# Patient Record
Sex: FEMALE | Race: OTHER | NOT HISPANIC OR LATINO | URBAN - METROPOLITAN AREA
[De-identification: names, ages, dates, MRNs, and addresses within clinical notes are randomized per-mention and may not be internally consistent; named-entity substitution may affect disease eponyms.]

---

## 2018-11-12 ENCOUNTER — OUTPATIENT (OUTPATIENT)
Dept: OUTPATIENT SERVICES | Facility: HOSPITAL | Age: 38
LOS: 1 days | End: 2018-11-12
Payer: SELF-PAY

## 2018-11-12 ENCOUNTER — APPOINTMENT (OUTPATIENT)
Dept: ULTRASOUND IMAGING | Facility: CLINIC | Age: 38
End: 2018-11-12
Payer: SUBSIDIZED

## 2018-11-12 DIAGNOSIS — Z00.8 ENCOUNTER FOR OTHER GENERAL EXAMINATION: ICD-10-CM

## 2018-11-12 DIAGNOSIS — Z98.890 OTHER SPECIFIED POSTPROCEDURAL STATES: Chronic | ICD-10-CM

## 2018-11-12 PROCEDURE — 76641 ULTRASOUND BREAST COMPLETE: CPT

## 2018-11-12 PROCEDURE — 76641 ULTRASOUND BREAST COMPLETE: CPT | Mod: 26,LT

## 2018-12-06 ENCOUNTER — OUTPATIENT (OUTPATIENT)
Dept: OUTPATIENT SERVICES | Facility: HOSPITAL | Age: 38
LOS: 1 days | End: 2018-12-06
Payer: COMMERCIAL

## 2018-12-06 ENCOUNTER — APPOINTMENT (OUTPATIENT)
Dept: ULTRASOUND IMAGING | Facility: CLINIC | Age: 38
End: 2018-12-06
Payer: SELF-PAY

## 2018-12-06 ENCOUNTER — RESULT REVIEW (OUTPATIENT)
Age: 38
End: 2018-12-06

## 2018-12-06 DIAGNOSIS — Z98.890 OTHER SPECIFIED POSTPROCEDURAL STATES: Chronic | ICD-10-CM

## 2018-12-06 DIAGNOSIS — Z00.8 ENCOUNTER FOR OTHER GENERAL EXAMINATION: ICD-10-CM

## 2018-12-06 PROCEDURE — 76830 TRANSVAGINAL US NON-OB: CPT | Mod: 26

## 2018-12-06 PROCEDURE — 76856 US EXAM PELVIC COMPLETE: CPT

## 2018-12-06 PROCEDURE — 76856 US EXAM PELVIC COMPLETE: CPT | Mod: 26

## 2018-12-06 PROCEDURE — 76830 TRANSVAGINAL US NON-OB: CPT

## 2019-01-10 ENCOUNTER — APPOINTMENT (OUTPATIENT)
Dept: CARDIOLOGY | Facility: HOSPITAL | Age: 39
End: 2019-01-10

## 2019-03-03 ENCOUNTER — EMERGENCY (EMERGENCY)
Facility: HOSPITAL | Age: 39
LOS: 0 days | Discharge: HOME | End: 2019-03-03
Attending: EMERGENCY MEDICINE | Admitting: EMERGENCY MEDICINE
Payer: MEDICAID

## 2019-03-03 VITALS
DIASTOLIC BLOOD PRESSURE: 70 MMHG | SYSTOLIC BLOOD PRESSURE: 110 MMHG | HEART RATE: 85 BPM | OXYGEN SATURATION: 100 % | RESPIRATION RATE: 18 BRPM

## 2019-03-03 VITALS — WEIGHT: 160.06 LBS | HEIGHT: 66 IN

## 2019-03-03 DIAGNOSIS — R07.9 CHEST PAIN, UNSPECIFIED: ICD-10-CM

## 2019-03-03 DIAGNOSIS — R05 COUGH: ICD-10-CM

## 2019-03-03 DIAGNOSIS — Z95.818 PRESENCE OF OTHER CARDIAC IMPLANTS AND GRAFTS: ICD-10-CM

## 2019-03-03 DIAGNOSIS — Z98.890 OTHER SPECIFIED POSTPROCEDURAL STATES: Chronic | ICD-10-CM

## 2019-03-03 LAB
ALBUMIN SERPL ELPH-MCNC: 3.8 G/DL — SIGNIFICANT CHANGE UP (ref 3.3–5)
ALP SERPL-CCNC: 70 U/L — SIGNIFICANT CHANGE UP (ref 40–120)
ALT FLD-CCNC: 12 U/L — SIGNIFICANT CHANGE UP (ref 12–78)
ANION GAP SERPL CALC-SCNC: 6 MMOL/L — SIGNIFICANT CHANGE UP (ref 5–17)
APPEARANCE UR: CLEAR — SIGNIFICANT CHANGE UP
APTT BLD: 28 SEC — SIGNIFICANT CHANGE UP (ref 27.5–36.3)
AST SERPL-CCNC: 14 U/L — LOW (ref 15–37)
BACTERIA # UR AUTO: ABNORMAL
BASOPHILS # BLD AUTO: 0.05 K/UL — SIGNIFICANT CHANGE UP (ref 0–0.2)
BASOPHILS NFR BLD AUTO: 0.5 % — SIGNIFICANT CHANGE UP (ref 0–2)
BILIRUB SERPL-MCNC: 0.2 MG/DL — SIGNIFICANT CHANGE UP (ref 0.2–1.2)
BILIRUB UR-MCNC: NEGATIVE — SIGNIFICANT CHANGE UP
BUN SERPL-MCNC: 13 MG/DL — SIGNIFICANT CHANGE UP (ref 7–23)
CALCIUM SERPL-MCNC: 8.5 MG/DL — SIGNIFICANT CHANGE UP (ref 8.5–10.1)
CHLORIDE SERPL-SCNC: 108 MMOL/L — SIGNIFICANT CHANGE UP (ref 96–108)
CO2 SERPL-SCNC: 28 MMOL/L — SIGNIFICANT CHANGE UP (ref 22–31)
COLOR SPEC: YELLOW — SIGNIFICANT CHANGE UP
CREAT SERPL-MCNC: 0.74 MG/DL — SIGNIFICANT CHANGE UP (ref 0.5–1.3)
DIFF PNL FLD: ABNORMAL
EOSINOPHIL # BLD AUTO: 0.39 K/UL — SIGNIFICANT CHANGE UP (ref 0–0.5)
EOSINOPHIL NFR BLD AUTO: 4.2 % — SIGNIFICANT CHANGE UP (ref 0–6)
EPI CELLS # UR: SIGNIFICANT CHANGE UP
GLUCOSE SERPL-MCNC: 108 MG/DL — HIGH (ref 70–99)
GLUCOSE UR QL: NEGATIVE MG/DL — SIGNIFICANT CHANGE UP
HCG SERPL-ACNC: <1 MIU/ML — SIGNIFICANT CHANGE UP
HCT VFR BLD CALC: 39.6 % — SIGNIFICANT CHANGE UP (ref 34.5–45)
HGB BLD-MCNC: 13.1 G/DL — SIGNIFICANT CHANGE UP (ref 11.5–15.5)
IMM GRANULOCYTES NFR BLD AUTO: 0.2 % — SIGNIFICANT CHANGE UP (ref 0–1.5)
INR BLD: 1.1 RATIO — SIGNIFICANT CHANGE UP (ref 0.88–1.16)
KETONES UR-MCNC: NEGATIVE — SIGNIFICANT CHANGE UP
LEUKOCYTE ESTERASE UR-ACNC: NEGATIVE — SIGNIFICANT CHANGE UP
LYMPHOCYTES # BLD AUTO: 2.91 K/UL — SIGNIFICANT CHANGE UP (ref 1–3.3)
LYMPHOCYTES # BLD AUTO: 31.4 % — SIGNIFICANT CHANGE UP (ref 13–44)
MCHC RBC-ENTMCNC: 29.4 PG — SIGNIFICANT CHANGE UP (ref 27–34)
MCHC RBC-ENTMCNC: 33.1 GM/DL — SIGNIFICANT CHANGE UP (ref 32–36)
MCV RBC AUTO: 89 FL — SIGNIFICANT CHANGE UP (ref 80–100)
MONOCYTES # BLD AUTO: 0.64 K/UL — SIGNIFICANT CHANGE UP (ref 0–0.9)
MONOCYTES NFR BLD AUTO: 6.9 % — SIGNIFICANT CHANGE UP (ref 2–14)
NEUTROPHILS # BLD AUTO: 5.26 K/UL — SIGNIFICANT CHANGE UP (ref 1.8–7.4)
NEUTROPHILS NFR BLD AUTO: 56.8 % — SIGNIFICANT CHANGE UP (ref 43–77)
NITRITE UR-MCNC: NEGATIVE — SIGNIFICANT CHANGE UP
NRBC # BLD: 0 /100 WBCS — SIGNIFICANT CHANGE UP (ref 0–0)
PH UR: 5 — SIGNIFICANT CHANGE UP (ref 5–8)
PLATELET # BLD AUTO: 327 K/UL — SIGNIFICANT CHANGE UP (ref 150–400)
POTASSIUM SERPL-MCNC: 3.8 MMOL/L — SIGNIFICANT CHANGE UP (ref 3.5–5.3)
POTASSIUM SERPL-SCNC: 3.8 MMOL/L — SIGNIFICANT CHANGE UP (ref 3.5–5.3)
PROT SERPL-MCNC: 7.8 GM/DL — SIGNIFICANT CHANGE UP (ref 6–8.3)
PROT UR-MCNC: NEGATIVE MG/DL — SIGNIFICANT CHANGE UP
PROTHROM AB SERPL-ACNC: 12.2 SEC — SIGNIFICANT CHANGE UP (ref 10–12.9)
RBC # BLD: 4.45 M/UL — SIGNIFICANT CHANGE UP (ref 3.8–5.2)
RBC # FLD: 12.7 % — SIGNIFICANT CHANGE UP (ref 10.3–14.5)
RBC CASTS # UR COMP ASSIST: ABNORMAL /HPF (ref 0–4)
SODIUM SERPL-SCNC: 142 MMOL/L — SIGNIFICANT CHANGE UP (ref 135–145)
SP GR SPEC: 1.02 — SIGNIFICANT CHANGE UP (ref 1.01–1.02)
TROPONIN I SERPL-MCNC: <0.015 NG/ML — SIGNIFICANT CHANGE UP (ref 0.01–0.04)
TROPONIN I SERPL-MCNC: <0.015 NG/ML — SIGNIFICANT CHANGE UP (ref 0.01–0.04)
UROBILINOGEN FLD QL: 1 MG/DL
WBC # BLD: 9.27 K/UL — SIGNIFICANT CHANGE UP (ref 3.8–10.5)
WBC # FLD AUTO: 9.27 K/UL — SIGNIFICANT CHANGE UP (ref 3.8–10.5)
WBC UR QL: SIGNIFICANT CHANGE UP

## 2019-03-03 PROCEDURE — 71046 X-RAY EXAM CHEST 2 VIEWS: CPT | Mod: 26

## 2019-03-03 PROCEDURE — 99284 EMERGENCY DEPT VISIT MOD MDM: CPT

## 2019-03-03 RX ORDER — CEPHALEXIN 500 MG
1 CAPSULE ORAL
Qty: 14 | Refills: 0
Start: 2019-03-03 | End: 2019-03-09

## 2019-03-03 RX ORDER — ACETAMINOPHEN 500 MG
975 TABLET ORAL ONCE
Refills: 0 | Status: COMPLETED | OUTPATIENT
Start: 2019-03-03 | End: 2019-03-03

## 2019-03-03 RX ORDER — CEPHALEXIN 500 MG
500 CAPSULE ORAL EVERY 12 HOURS
Refills: 0 | Status: DISCONTINUED | OUTPATIENT
Start: 2019-03-03 | End: 2019-03-03

## 2019-03-03 RX ORDER — DEXAMETHASONE 0.5 MG/5ML
5 ELIXIR ORAL ONCE
Refills: 0 | Status: COMPLETED | OUTPATIENT
Start: 2019-03-03 | End: 2019-03-03

## 2019-03-03 RX ORDER — SODIUM CHLORIDE 9 MG/ML
1000 INJECTION INTRAMUSCULAR; INTRAVENOUS; SUBCUTANEOUS ONCE
Refills: 0 | Status: COMPLETED | OUTPATIENT
Start: 2019-03-03 | End: 2019-03-03

## 2019-03-03 RX ADMIN — Medication 100 MILLIGRAM(S): at 18:21

## 2019-03-03 RX ADMIN — Medication 500 MILLIGRAM(S): at 22:41

## 2019-03-03 RX ADMIN — SODIUM CHLORIDE 1000 MILLILITER(S): 9 INJECTION INTRAMUSCULAR; INTRAVENOUS; SUBCUTANEOUS at 18:22

## 2019-03-03 RX ADMIN — Medication 5 MILLIGRAM(S): at 18:21

## 2019-03-03 RX ADMIN — Medication 975 MILLIGRAM(S): at 18:21

## 2019-03-03 NOTE — ED STATDOCS - OBJECTIVE STATEMENT
37 y/o F with PMHx of Prediabetes and questionable heart condition (reports that she has a cardiac monitoring device implanted to monitor her after she was experiencing multiple syncopal episodes), presenting to the ED c/o back pain, CP, cough, sore throat, HA, and watery eyes starting yesterday. Pt speaks [place]men and HPI obtained via [facility] , #[id number]. No fevers, chills, rashes, or SOB. NKDA.

## 2019-03-03 NOTE — ED STATDOCS - NS_ ATTENDINGSCRIBEDETAILS _ED_A_ED_FT
The scribe's documentation has been prepared under my direction and personally reviewed by me in its entirety.  I confirm that the note above accurately reflects all my work, treatment, procedures, and decision making except where otherwise noted or amended by me.  Phil Pappas M.D.

## 2019-03-03 NOTE — ED STATDOCS - CLINICAL SUMMARY MEDICAL DECISION MAKING FREE TEXT BOX
39 y/o F with PMHx of Prediabetes and questionable heart condition (reports that she has a cardiac monitoring device implanted to monitor her after she was experiencing multiple syncopal episodes), presenting to the ED c/o back pain, CP, cough, sore throat, HA, and watery eyes starting yesterday. VSS. Exam non-focal. Plan: labs, CXR, symptomatic treatment, reassess. 39 y/o F with PMHx of Prediabetes and questionable heart condition (reports that she has a cardiac monitoring device implanted to monitor her after she was experiencing multiple syncopal episodes), presenting to the ED c/o back pain, CP, cough, sore throat, HA, and watery eyes starting yesterday. VSS. Exam non-focal. Plan: EKG, serial trop, labs, CXR, symptomatic treatment, reassess.

## 2019-03-03 NOTE — ED STATDOCS - PROGRESS NOTE DETAILS
History with assistance from  ID#[id number]. Pt is a 39 y/o F with PMH of Pre-DM, ?heart condition, followed by Dr. Philippe outpatient cardiology, presents with productive cough, CP, SOB x 2 days. Pt reports subjective fever, chills since that time as well. Reports her landlord sick with similar symptoms over that time. Reports CP as present when she coughs. Productive cough with green sputum. Denies recent trauma, prolonged travel. PE: Well appearing. Cardiac: s1/s2, RRR. Lungs: CTAB. Abdomen: NBS x4, soft, nontender. HEENT: No oropharyngeal erythema, tonsilar enlargement/exudates, uvular deviation. A/P: likely viral URI. Will check labs, EKG, CXR, reassess. - Raf Enriquez PA-C Results of Lab work and chest X-Ray discussed with patient. Reports given to patient. Jone HOWARD

## 2019-03-03 NOTE — ED STATDOCS - ATTENDING CONTRIBUTION TO CARE
SOTO Pappas MD,  performed the initial face to face bedside interview with this patient regarding history of present illness, review of symptoms and relevant past medical, social and family history.  I completed an independent physical examination.  I was the initial provider who evaluated this patient. I have signed out the follow up of any pending tests (i.e. labs, radiological studies) to the ACP.  I have communicated the patient’s plan of care and disposition with the ACP.

## 2019-03-04 LAB
CULTURE RESULTS: SIGNIFICANT CHANGE UP
SPECIMEN SOURCE: SIGNIFICANT CHANGE UP

## 2019-04-01 ENCOUNTER — APPOINTMENT (OUTPATIENT)
Dept: ELECTROPHYSIOLOGY | Facility: CLINIC | Age: 39
End: 2019-04-01
Payer: MEDICAID

## 2019-04-01 VITALS
SYSTOLIC BLOOD PRESSURE: 192 MMHG | DIASTOLIC BLOOD PRESSURE: 64 MMHG | HEART RATE: 83 BPM | HEIGHT: 62 IN | WEIGHT: 128 LBS | BODY MASS INDEX: 23.55 KG/M2

## 2019-04-01 DIAGNOSIS — Z45.09 ENCOUNTER FOR ADJUSTMENT AND MANAGEMENT OF OTHER CARDIAC DEVICE: ICD-10-CM

## 2019-04-01 PROBLEM — Z00.00 ENCOUNTER FOR PREVENTIVE HEALTH EXAMINATION: Noted: 2019-04-01

## 2019-04-01 PROCEDURE — 93285 PRGRMG DEV EVAL SCRMS IP: CPT

## 2019-04-01 PROCEDURE — XXXXX: CPT

## 2019-04-01 NOTE — HISTORY OF PRESENT ILLNESS
[None] : The patient complains of no symptoms [de-identified] : 67 yo female referred from  [facility] associate group (Dr Philippe [phone]) for evaluation of LINQ loop recorder.  Pt states she moved here fro [place], and had this loop recorder place for fainting episodes.  she denies any since implant, c/o occasional pain and tingling at the device site. LINQ interrogathio shows sinus rhythm, multiple event of tachy recorded and appear to be sinus tachy up to heart rate of 188bpm, most recent episode in december 2018, loop recorder at [facility] since 12/25/18.  prior to this linq was last interrogated in august 2016, and patient has not follow up with cardiologist or EP since then.  she reports occasional symptoms of palpitations but they are mild, and she requesting to take the LINQ out.

## 2019-04-01 NOTE — DISCUSSION/SUMMARY
[Patient] : the patient [Family] : the patient's family [FreeTextEntry1] : LINQ at [facility], will schedule LINQ explant.

## 2019-04-01 NOTE — REASON FOR VISIT
[New Patient Device Check] : new patient device check visit [LUCHO (Elective Replacement Indication)] : elective replacement indication [Family Member] : family member

## 2019-04-01 NOTE — PHYSICAL EXAM
[General Appearance - Well Developed] : well developed [Normal Appearance] : normal appearance [Well Groomed] : well groomed [General Appearance - Well Nourished] : well nourished [No Deformities] : no deformities [General Appearance - In No Acute Distress] : no acute distress [Heart Rate And Rhythm] : heart rate and rhythm were normal [Heart Sounds] : normal S1 and S2 [Murmurs] : no murmurs present [Respiration, Rhythm And Depth] : normal respiratory rhythm and effort [Exaggerated Use Of Accessory Muscles For Inspiration] : no accessory muscle use [Auscultation Breath Sounds / Voice Sounds] : lungs were clear to auscultation bilaterally [Clean] : clean [Dry] : dry [Well-Healed] : well-healed [Nail Clubbing] : no clubbing of the fingernails [Cyanosis, Localized] : no localized cyanosis [Petechial Hemorrhages (___cm)] : no petechial hemorrhages [] : no ischemic changes

## 2019-04-17 NOTE — ASU PATIENT PROFILE, ADULT - PSH
Encounter for loop recorder at end of battery life    H/O tubal ligation    History of loop recorder

## 2019-04-18 ENCOUNTER — OUTPATIENT (OUTPATIENT)
Dept: OUTPATIENT SERVICES | Facility: HOSPITAL | Age: 39
LOS: 1 days | Discharge: HOME | End: 2019-04-18
Payer: COMMERCIAL

## 2019-04-18 VITALS
RESPIRATION RATE: 16 BRPM | OXYGEN SATURATION: 100 % | HEART RATE: 72 BPM | SYSTOLIC BLOOD PRESSURE: 103 MMHG | DIASTOLIC BLOOD PRESSURE: 62 MMHG

## 2019-04-18 VITALS
OXYGEN SATURATION: 100 % | HEART RATE: 73 BPM | RESPIRATION RATE: 16 BRPM | WEIGHT: 128.09 LBS | DIASTOLIC BLOOD PRESSURE: 60 MMHG | HEIGHT: 62 IN | SYSTOLIC BLOOD PRESSURE: 105 MMHG

## 2019-04-18 DIAGNOSIS — Z45.09 ENCOUNTER FOR ADJUSTMENT AND MANAGEMENT OF OTHER CARDIAC DEVICE: Chronic | ICD-10-CM

## 2019-04-18 DIAGNOSIS — Z98.890 OTHER SPECIFIED POSTPROCEDURAL STATES: Chronic | ICD-10-CM

## 2019-04-18 DIAGNOSIS — Z98.51 TUBAL LIGATION STATUS: Chronic | ICD-10-CM

## 2019-04-18 PROCEDURE — 33286 RMVL SUBQ CAR RHYTHM MNTR: CPT

## 2019-04-18 NOTE — PROCEDURAL SAFETY CHECKLIST WITH OR WITHOUT SEDATION - NSPOSTCOMMENTFT_GEN_ALL_CORE
Detail Level: Zone
Procedure halted in the holding area and pt transferred to EP Lab 1 for x Ray visualization of ILR to aide physician with removal. Dressing over wound, Procedure explained to pt and pt transferred to ep lab 1.

## 2019-04-18 NOTE — PACU DISCHARGE NOTE - COMMENTS
pt tolerated procedure well. Family at bedside.  IV removed intact. bandaid applied, no bleeding noted. Chest dressing clean dry and intact. vss. Discharge instructions given with follow up appointment, pt verbalized understanding through .  Pt discharged with her mother, Brigid, who will drive pt. home.

## 2019-04-24 DIAGNOSIS — R73.03 PREDIABETES: ICD-10-CM

## 2019-04-24 DIAGNOSIS — R00.2 PALPITATIONS: ICD-10-CM

## 2019-04-24 DIAGNOSIS — Z45.09 ENCOUNTER FOR ADJUSTMENT AND MANAGEMENT OF OTHER CARDIAC DEVICE: ICD-10-CM

## 2019-05-03 ENCOUNTER — APPOINTMENT (OUTPATIENT)
Dept: ELECTROPHYSIOLOGY | Facility: CLINIC | Age: 39
End: 2019-05-03
Payer: MEDICAID

## 2019-05-03 VITALS
HEART RATE: 77 BPM | HEIGHT: 62 IN | DIASTOLIC BLOOD PRESSURE: 52 MMHG | WEIGHT: 129 LBS | SYSTOLIC BLOOD PRESSURE: 99 MMHG | BODY MASS INDEX: 23.74 KG/M2

## 2019-05-03 DIAGNOSIS — Z98.890 OTHER SPECIFIED POSTPROCEDURAL STATES: ICD-10-CM

## 2019-05-03 PROCEDURE — 99211 OFF/OP EST MAY X REQ PHY/QHP: CPT

## 2019-07-05 ENCOUNTER — EMERGENCY (EMERGENCY)
Facility: HOSPITAL | Age: 39
LOS: 0 days | Discharge: HOME | End: 2019-07-06
Attending: FAMILY MEDICINE | Admitting: FAMILY MEDICINE
Payer: MEDICAID

## 2019-07-05 VITALS
TEMPERATURE: 99 F | DIASTOLIC BLOOD PRESSURE: 59 MMHG | SYSTOLIC BLOOD PRESSURE: 105 MMHG | OXYGEN SATURATION: 100 % | HEIGHT: 61 IN | HEART RATE: 75 BPM | WEIGHT: 126.99 LBS | RESPIRATION RATE: 17 BRPM

## 2019-07-05 VITALS
OXYGEN SATURATION: 100 % | RESPIRATION RATE: 17 BRPM | SYSTOLIC BLOOD PRESSURE: 110 MMHG | DIASTOLIC BLOOD PRESSURE: 76 MMHG | HEART RATE: 72 BPM

## 2019-07-05 DIAGNOSIS — Z98.51 TUBAL LIGATION STATUS: ICD-10-CM

## 2019-07-05 DIAGNOSIS — R73.03 PREDIABETES: ICD-10-CM

## 2019-07-05 DIAGNOSIS — Z98.51 TUBAL LIGATION STATUS: Chronic | ICD-10-CM

## 2019-07-05 DIAGNOSIS — R07.9 CHEST PAIN, UNSPECIFIED: ICD-10-CM

## 2019-07-05 DIAGNOSIS — Z98.890 OTHER SPECIFIED POSTPROCEDURAL STATES: Chronic | ICD-10-CM

## 2019-07-05 DIAGNOSIS — Z79.84 LONG TERM (CURRENT) USE OF ORAL HYPOGLYCEMIC DRUGS: ICD-10-CM

## 2019-07-05 DIAGNOSIS — Z45.09 ENCOUNTER FOR ADJUSTMENT AND MANAGEMENT OF OTHER CARDIAC DEVICE: Chronic | ICD-10-CM

## 2019-07-05 LAB
ALBUMIN SERPL ELPH-MCNC: 3.8 G/DL — SIGNIFICANT CHANGE UP (ref 3.3–5)
ALP SERPL-CCNC: 88 U/L — SIGNIFICANT CHANGE UP (ref 40–120)
ALT FLD-CCNC: 18 U/L — SIGNIFICANT CHANGE UP (ref 12–78)
ANION GAP SERPL CALC-SCNC: 6 MMOL/L — SIGNIFICANT CHANGE UP (ref 5–17)
APPEARANCE UR: CLEAR — SIGNIFICANT CHANGE UP
APTT BLD: 26.8 SEC — LOW (ref 27.5–36.3)
AST SERPL-CCNC: 15 U/L — SIGNIFICANT CHANGE UP (ref 15–37)
BACTERIA # UR AUTO: ABNORMAL
BILIRUB SERPL-MCNC: 0.3 MG/DL — SIGNIFICANT CHANGE UP (ref 0.2–1.2)
BILIRUB UR-MCNC: NEGATIVE — SIGNIFICANT CHANGE UP
BUN SERPL-MCNC: 10 MG/DL — SIGNIFICANT CHANGE UP (ref 7–23)
CALCIUM SERPL-MCNC: 8.2 MG/DL — LOW (ref 8.5–10.1)
CHLORIDE SERPL-SCNC: 108 MMOL/L — SIGNIFICANT CHANGE UP (ref 96–108)
CO2 SERPL-SCNC: 27 MMOL/L — SIGNIFICANT CHANGE UP (ref 22–31)
COLOR SPEC: YELLOW — SIGNIFICANT CHANGE UP
CREAT SERPL-MCNC: 0.92 MG/DL — SIGNIFICANT CHANGE UP (ref 0.5–1.3)
D DIMER BLD IA.RAPID-MCNC: <150 NG/ML DDU — SIGNIFICANT CHANGE UP
DIFF PNL FLD: ABNORMAL
EPI CELLS # UR: SIGNIFICANT CHANGE UP
GLUCOSE SERPL-MCNC: 105 MG/DL — HIGH (ref 70–99)
GLUCOSE UR QL: NEGATIVE MG/DL — SIGNIFICANT CHANGE UP
HCT VFR BLD CALC: 36 % — SIGNIFICANT CHANGE UP (ref 34.5–45)
HGB BLD-MCNC: 11.6 G/DL — SIGNIFICANT CHANGE UP (ref 11.5–15.5)
INR BLD: 0.99 RATIO — SIGNIFICANT CHANGE UP (ref 0.88–1.16)
KETONES UR-MCNC: NEGATIVE — SIGNIFICANT CHANGE UP
LEUKOCYTE ESTERASE UR-ACNC: NEGATIVE — SIGNIFICANT CHANGE UP
MAGNESIUM SERPL-MCNC: 2.1 MG/DL — SIGNIFICANT CHANGE UP (ref 1.6–2.6)
MCHC RBC-ENTMCNC: 28.5 PG — SIGNIFICANT CHANGE UP (ref 27–34)
MCHC RBC-ENTMCNC: 32.2 GM/DL — SIGNIFICANT CHANGE UP (ref 32–36)
MCV RBC AUTO: 88.5 FL — SIGNIFICANT CHANGE UP (ref 80–100)
NITRITE UR-MCNC: NEGATIVE — SIGNIFICANT CHANGE UP
PH UR: 5 — SIGNIFICANT CHANGE UP (ref 5–8)
PLATELET # BLD AUTO: 363 K/UL — SIGNIFICANT CHANGE UP (ref 150–400)
POTASSIUM SERPL-MCNC: 3.4 MMOL/L — LOW (ref 3.5–5.3)
POTASSIUM SERPL-SCNC: 3.4 MMOL/L — LOW (ref 3.5–5.3)
PROT SERPL-MCNC: 7.5 GM/DL — SIGNIFICANT CHANGE UP (ref 6–8.3)
PROT UR-MCNC: NEGATIVE MG/DL — SIGNIFICANT CHANGE UP
PROTHROM AB SERPL-ACNC: 11 SEC — SIGNIFICANT CHANGE UP (ref 10–12.9)
RBC # BLD: 4.07 M/UL — SIGNIFICANT CHANGE UP (ref 3.8–5.2)
RBC # FLD: 12.6 % — SIGNIFICANT CHANGE UP (ref 10.3–14.5)
RBC CASTS # UR COMP ASSIST: SIGNIFICANT CHANGE UP /HPF (ref 0–4)
SODIUM SERPL-SCNC: 141 MMOL/L — SIGNIFICANT CHANGE UP (ref 135–145)
SP GR SPEC: 1.01 — SIGNIFICANT CHANGE UP (ref 1.01–1.02)
TROPONIN I SERPL-MCNC: <0.015 NG/ML — SIGNIFICANT CHANGE UP (ref 0.01–0.04)
TROPONIN I SERPL-MCNC: <0.015 NG/ML — SIGNIFICANT CHANGE UP (ref 0.01–0.04)
UROBILINOGEN FLD QL: NEGATIVE MG/DL — SIGNIFICANT CHANGE UP
WBC # BLD: 9.4 K/UL — SIGNIFICANT CHANGE UP (ref 3.8–10.5)
WBC # FLD AUTO: 9.4 K/UL — SIGNIFICANT CHANGE UP (ref 3.8–10.5)
WBC UR QL: SIGNIFICANT CHANGE UP

## 2019-07-05 PROCEDURE — 99284 EMERGENCY DEPT VISIT MOD MDM: CPT

## 2019-07-05 PROCEDURE — 93010 ELECTROCARDIOGRAM REPORT: CPT

## 2019-07-05 PROCEDURE — 71045 X-RAY EXAM CHEST 1 VIEW: CPT | Mod: 26

## 2019-07-05 RX ORDER — KETOROLAC TROMETHAMINE 30 MG/ML
30 SYRINGE (ML) INJECTION ONCE
Refills: 0 | Status: DISCONTINUED | OUTPATIENT
Start: 2019-07-05 | End: 2019-07-05

## 2019-07-05 RX ORDER — SODIUM CHLORIDE 9 MG/ML
1000 INJECTION INTRAMUSCULAR; INTRAVENOUS; SUBCUTANEOUS ONCE
Refills: 0 | Status: COMPLETED | OUTPATIENT
Start: 2019-07-05 | End: 2019-07-05

## 2019-07-05 RX ORDER — ASPIRIN/CALCIUM CARB/MAGNESIUM 324 MG
81 TABLET ORAL ONCE
Refills: 0 | Status: COMPLETED | OUTPATIENT
Start: 2019-07-05 | End: 2019-07-05

## 2019-07-05 RX ORDER — POTASSIUM CHLORIDE 20 MEQ
40 PACKET (EA) ORAL ONCE
Refills: 0 | Status: COMPLETED | OUTPATIENT
Start: 2019-07-05 | End: 2019-07-05

## 2019-07-05 RX ADMIN — Medication 81 MILLIGRAM(S): at 20:02

## 2019-07-05 RX ADMIN — Medication 40 MILLIEQUIVALENT(S): at 23:31

## 2019-07-05 RX ADMIN — SODIUM CHLORIDE 1000 MILLILITER(S): 9 INJECTION INTRAMUSCULAR; INTRAVENOUS; SUBCUTANEOUS at 20:02

## 2019-07-05 RX ADMIN — SODIUM CHLORIDE 1000 MILLILITER(S): 9 INJECTION INTRAMUSCULAR; INTRAVENOUS; SUBCUTANEOUS at 21:20

## 2019-07-05 RX ADMIN — Medication 30 MILLIGRAM(S): at 23:45

## 2019-07-05 NOTE — ED PROVIDER NOTE - PROGRESS NOTE DETAILS
Pt states has had pain in left chest since loop recorder removed months ago. Has had recent neg stress test. No swelling but has scar on left breast. No erythema or fluctuance. Has reproducible tenderness. Will give toradol and advise follow up. Gregor AVILEZ

## 2019-07-05 NOTE — ED ADULT NURSE NOTE - NSIMPLEMENTINTERV_GEN_ALL_ED
Implemented All Universal Safety Interventions:  [place] to call system. Call bell, personal items and telephone within reach. Instruct patient to call for assistance. Room bathroom lighting operational. Non-slip footwear when patient is off stretcher. Physically safe environment: no spills, clutter or unnecessary equipment. Stretcher in lowest position, wheels locked, appropriate side rails in place.

## 2019-07-05 NOTE — ED ADULT NURSE NOTE - OBJECTIVE STATEMENT
Pt comes in complaining of chest pain that started yesterday. pain is 8/10 that radiates from left chest down left arm. pt complains of numbness, sob, nausea, and headache. pt in no respiratory distress at bedside. no aspirin taken today. denies vomiting, diarrhea. pt states she had a chip removed from her heart 2 months ago here at  that monitored her rate but doesn't know what it was called.

## 2019-12-02 ENCOUNTER — EMERGENCY (EMERGENCY)
Facility: HOSPITAL | Age: 39
LOS: 0 days | Discharge: HOME | End: 2019-12-02
Attending: EMERGENCY MEDICINE
Payer: COMMERCIAL

## 2019-12-02 VITALS
SYSTOLIC BLOOD PRESSURE: 120 MMHG | TEMPERATURE: 98 F | RESPIRATION RATE: 16 BRPM | OXYGEN SATURATION: 100 % | DIASTOLIC BLOOD PRESSURE: 81 MMHG | HEART RATE: 75 BPM

## 2019-12-02 VITALS — HEIGHT: 61 IN | WEIGHT: 126.99 LBS

## 2019-12-02 DIAGNOSIS — N39.0 URINARY TRACT INFECTION, SITE NOT SPECIFIED: ICD-10-CM

## 2019-12-02 DIAGNOSIS — R73.03 PREDIABETES: ICD-10-CM

## 2019-12-02 DIAGNOSIS — R35.0 FREQUENCY OF MICTURITION: ICD-10-CM

## 2019-12-02 DIAGNOSIS — Z98.51 TUBAL LIGATION STATUS: Chronic | ICD-10-CM

## 2019-12-02 DIAGNOSIS — Z98.890 OTHER SPECIFIED POSTPROCEDURAL STATES: Chronic | ICD-10-CM

## 2019-12-02 DIAGNOSIS — R30.0 DYSURIA: ICD-10-CM

## 2019-12-02 DIAGNOSIS — Z45.09 ENCOUNTER FOR ADJUSTMENT AND MANAGEMENT OF OTHER CARDIAC DEVICE: Chronic | ICD-10-CM

## 2019-12-02 DIAGNOSIS — R39.15 URGENCY OF URINATION: ICD-10-CM

## 2019-12-02 LAB
APPEARANCE UR: CLEAR — SIGNIFICANT CHANGE UP
BILIRUB UR-MCNC: NEGATIVE — SIGNIFICANT CHANGE UP
COLOR SPEC: YELLOW — SIGNIFICANT CHANGE UP
DIFF PNL FLD: ABNORMAL
GLUCOSE UR QL: NEGATIVE MG/DL — SIGNIFICANT CHANGE UP
KETONES UR-MCNC: ABNORMAL
LEUKOCYTE ESTERASE UR-ACNC: ABNORMAL
NITRITE UR-MCNC: NEGATIVE — SIGNIFICANT CHANGE UP
PH UR: 6.5 — SIGNIFICANT CHANGE UP (ref 5–8)
PROT UR-MCNC: NEGATIVE MG/DL — SIGNIFICANT CHANGE UP
SP GR SPEC: 1.01 — SIGNIFICANT CHANGE UP (ref 1.01–1.02)
UROBILINOGEN FLD QL: NEGATIVE MG/DL — SIGNIFICANT CHANGE UP

## 2019-12-02 PROCEDURE — 81001 URINALYSIS AUTO W/SCOPE: CPT

## 2019-12-02 PROCEDURE — 99283 EMERGENCY DEPT VISIT LOW MDM: CPT

## 2019-12-02 PROCEDURE — 87186 SC STD MICRODIL/AGAR DIL: CPT

## 2019-12-02 PROCEDURE — 87086 URINE CULTURE/COLONY COUNT: CPT

## 2019-12-02 RX ORDER — CEPHALEXIN 500 MG
1 CAPSULE ORAL
Qty: 14 | Refills: 0
Start: 2019-12-02 | End: 2019-12-08

## 2019-12-02 RX ORDER — CEPHALEXIN 500 MG
500 CAPSULE ORAL ONCE
Refills: 0 | Status: COMPLETED | OUTPATIENT
Start: 2019-12-02 | End: 2019-12-02

## 2019-12-02 RX ADMIN — Medication 500 MILLIGRAM(S): at 21:11

## 2019-12-02 NOTE — ED STATDOCS - PATIENT PORTAL LINK FT
You can access the FollowMyHealth Patient Portal offered by [facility] by registering at the following website: [email or web address]. By joining [employer]’s FollowMyHealth portal, you will also be able to view your health information using other applications (apps) compatible with our system.

## 2019-12-02 NOTE — ED ADULT TRIAGE NOTE - CHIEF COMPLAINT QUOTE
pt aaox4, pt presents to er blood in urine and pain upon urination since yesterday, pt c/o feeling warm, but unsure if she has fever.

## 2019-12-02 NOTE — ED STATDOCS - NSFOLLOWUPINSTRUCTIONS_ED_ALL_ED_FT
Urinary Tract Infection, Adult  ImageA urinary tract infection (UTI) is an infection of any part of the urinary tract, which includes the kidneys, ureters, bladder, and urethra. These organs make, store, and get rid of urine in the body. UTI can be a bladder infection (cystitis) or kidney infection (pyelonephritis).    What are the causes?  This infection may be caused by fungi, viruses, or bacteria. Bacteria are the most common cause of UTIs. This condition can also be caused by repeated incomplete emptying of the bladder during urination.    What increases the risk?  This condition is more likely to develop if:    You ignore your need to urinate or hold urine for long periods of time.  You do not empty your bladder completely during urination.  You wipe back to front after urinating or having a bowel movement, if you are female.  You are uncircumcised, if you are male.  You are constipated.  You have a urinary catheter that stays in place (indwelling).  You have a weak defense (immune) system.  You have a medical condition that affects your bowels, kidneys, or bladder.  You have diabetes.  You take antibiotic medicines frequently or for long periods of time, and the antibiotics no longer work well against certain types of infections (antibiotic resistance).  You take medicines that irritate your urinary tract.  You are exposed to chemicals that irritate your urinary tract.  You are female.    What are the signs or symptoms?  Symptoms of this condition include:    Fever.  Frequent urination or passing small amounts of urine frequently.  Needing to urinate urgently.  Pain or burning with urination.  Urine that smells bad or unusual.  Cloudy urine.  Pain in the lower abdomen or back.  Trouble urinating.  Blood in the urine.  Vomiting or being less hungry than normal.  Diarrhea or abdominal pain.  Vaginal discharge, if you are female.    How is this diagnosed?  This condition is diagnosed with a medical history and physical exam. You will also need to provide a urine sample to test your urine. Other tests may be done, including:    Blood tests.  Sexually transmitted disease (STD) testing.    If you have had more than one UTI, a cystoscopy or imaging studies may be done to determine the cause of the infections.    How is this treated?  Treatment for this condition often includes a combination of two or more of the following:    Antibiotic medicine.  Other medicines to treat less common causes of UTI.  Over-the-counter medicines to treat pain.  Drinking enough water to stay hydrated.    Follow these instructions at home:  Take over-the-counter and prescription medicines only as told by your health care provider.  If you were prescribed an antibiotic, take it as told by your health care provider. Do not stop taking the antibiotic even if you start to feel better.  Avoid alcohol, caffeine, tea, and carbonated beverages. They can irritate your bladder.  Drink enough fluid to keep your urine clear or pale yellow.  Keep all follow-up visits as told by your health care provider. This is important.  ImageMake sure to:    Empty your bladder often and completely. Do not hold urine for long periods of time.  Empty your bladder before and after sex.  Wipe from front to back after a bowel movement if you are female. Use each tissue one time when you wipe.    Contact a health care provider if:  You have back pain.  You have a fever.  You feel nauseous or vomit.  Your symptoms do not get better after 3 days.  Your symptoms go away and then return.  Get help right away if:  You have severe back pain or lower abdominal pain.  You are vomiting and cannot keep down any medicines or water.  This information is not intended to replace advice given to you by your health care provider. Make sure you discuss any questions you have with your health care provider. Urinary Tract Infection, Adult  urinary tract infection (UTI) is an infection of any part of the urinary tract, which includes the kidneys, ureters, bladder, and urethra. These organs make, store, and get rid of urine in the body. UTI can be a bladder infection (cystitis) or kidney infection (pyelonephritis).    What are the causes?  This infection may be caused by fungi, viruses, or bacteria. Bacteria are the most common cause of UTIs. This condition can also be caused by repeated incomplete emptying of the bladder during urination.    What increases the risk?  This condition is more likely to develop if:    You ignore your need to urinate or hold urine for long periods of time.  You do not empty your bladder completely during urination.  You wipe back to front after urinating or having a bowel movement, if you are female.  You are uncircumcised, if you are male.  You are constipated.  You have a urinary catheter that stays in place (indwelling).  You have a weak defense (immune) system.  You have a medical condition that affects your bowels, kidneys, or bladder.  You have diabetes.  You take antibiotic medicines frequently or for long periods of time, and the antibiotics no longer work well against certain types of infections (antibiotic resistance).  You take medicines that irritate your urinary tract.  You are exposed to chemicals that irritate your urinary tract.  You are female.    What are the signs or symptoms?  Symptoms of this condition include:    Fever.  Frequent urination or passing small amounts of urine frequently.  Needing to urinate urgently.  Pain or burning with urination.  Urine that smells bad or unusual.  Cloudy urine.  Pain in the lower abdomen or back.  Trouble urinating.  Blood in the urine.  Vomiting or being less hungry than normal.  Diarrhea or abdominal pain.  Vaginal discharge, if you are female.    How is this diagnosed?  This condition is diagnosed with a medical history and physical exam. You will also need to provide a urine sample to test your urine. Other tests may be done, including:    Blood tests.  Sexually transmitted disease (STD) testing.    If you have had more than one UTI, a cystoscopy or imaging studies may be done to determine the cause of the infections.    How is this treated?  Treatment for this condition often includes a combination of two or more of the following:    Antibiotic medicine.  Other medicines to treat less common causes of UTI.  Over-the-counter medicines to treat pain.  Drinking enough water to stay hydrated.    Follow these instructions at home:  Take over-the-counter and prescription medicines only as told by your health care provider.  If you were prescribed an antibiotic, take it as told by your health care provider. Do not stop taking the antibiotic even if you start to feel better.  Avoid alcohol, caffeine, tea, and carbonated beverages. They can irritate your bladder.  Drink enough fluid to keep your urine clear or pale yellow.  Keep all follow-up visits as told by your health care provider. This is important.  ImageMake sure to:    Empty your bladder often and completely. Do not hold urine for long periods of time.  Empty your bladder before and after sex.  Wipe from front to back after a bowel movement if you are female. Use each tissue one time when you wipe.    Contact a health care provider if:  You have back pain.  You have a fever.  You feel nauseous or vomit.  Your symptoms do not get better after 3 days.  Your symptoms go away and then return.  Get help right away if:  You have severe back pain or lower abdominal pain.  You are vomiting and cannot keep down any medicines or water.  This information is not intended to replace advice given to you by your health care provider. Make sure you discuss any questions you have with your health care provider.

## 2019-12-02 NOTE — ED STATDOCS - PHYSICAL EXAMINATION
Constitutional: NAD AAOx3  Eyes: PERRLA EOMI  Mouth: MMM  Cardiac: s1s2. rrr  Lungs: CTA B/L. No accessory muscle use  Abd: soft, nd. Mild suprapubic tenderness. no r/g/r. Neg CVAT B/L  Skin: No rashes

## 2019-12-02 NOTE — ED STATDOCS - OBJECTIVE STATEMENT
[facility]  [id number]. 38 y/o F presents with dysuria since yesterday. Pt complaining of dysuria, frequency/urgency and a small amount of blood in her urine. Pt reports mild suprapubic discomfort. Subjective fever at home. LMP 4 days ago. Denies n/v/d, or other complaints at this time. -Stephen Kendall PA-C

## 2019-12-02 NOTE — ED STATDOCS - PROGRESS NOTE DETAILS
Results reviewed and discussed with pt. Will tx with keflex for uti. Discussed importance of close FU with PMD. Pt asked to return to ED immediately for any new or concerning sx or worsening. Pt acknowledges and understands plan -Stephen Kendall PA-C

## 2019-12-13 ENCOUNTER — EMERGENCY (EMERGENCY)
Facility: HOSPITAL | Age: 39
LOS: 0 days | Discharge: HOME | End: 2019-12-13
Attending: EMERGENCY MEDICINE
Payer: COMMERCIAL

## 2019-12-13 VITALS
HEART RATE: 89 BPM | OXYGEN SATURATION: 100 % | DIASTOLIC BLOOD PRESSURE: 71 MMHG | RESPIRATION RATE: 17 BRPM | SYSTOLIC BLOOD PRESSURE: 104 MMHG

## 2019-12-13 VITALS — HEIGHT: 62 IN | WEIGHT: 128.09 LBS

## 2019-12-13 DIAGNOSIS — Z45.09 ENCOUNTER FOR ADJUSTMENT AND MANAGEMENT OF OTHER CARDIAC DEVICE: Chronic | ICD-10-CM

## 2019-12-13 DIAGNOSIS — Z98.890 OTHER SPECIFIED POSTPROCEDURAL STATES: Chronic | ICD-10-CM

## 2019-12-13 DIAGNOSIS — R06.02 SHORTNESS OF BREATH: ICD-10-CM

## 2019-12-13 DIAGNOSIS — M54.9 DORSALGIA, UNSPECIFIED: ICD-10-CM

## 2019-12-13 DIAGNOSIS — Z98.51 TUBAL LIGATION STATUS: ICD-10-CM

## 2019-12-13 DIAGNOSIS — R73.03 PREDIABETES: ICD-10-CM

## 2019-12-13 DIAGNOSIS — R30.0 DYSURIA: ICD-10-CM

## 2019-12-13 DIAGNOSIS — Z98.51 TUBAL LIGATION STATUS: Chronic | ICD-10-CM

## 2019-12-13 DIAGNOSIS — R05 COUGH: ICD-10-CM

## 2019-12-13 DIAGNOSIS — J06.9 ACUTE UPPER RESPIRATORY INFECTION, UNSPECIFIED: ICD-10-CM

## 2019-12-13 LAB
APPEARANCE UR: CLEAR — SIGNIFICANT CHANGE UP
BILIRUB UR-MCNC: NEGATIVE — SIGNIFICANT CHANGE UP
COLOR SPEC: YELLOW — SIGNIFICANT CHANGE UP
DIFF PNL FLD: ABNORMAL
GLUCOSE UR QL: NEGATIVE MG/DL — SIGNIFICANT CHANGE UP
KETONES UR-MCNC: NEGATIVE — SIGNIFICANT CHANGE UP
LEUKOCYTE ESTERASE UR-ACNC: NEGATIVE — SIGNIFICANT CHANGE UP
NITRITE UR-MCNC: NEGATIVE — SIGNIFICANT CHANGE UP
PH UR: 7 — SIGNIFICANT CHANGE UP (ref 5–8)
PROT UR-MCNC: NEGATIVE MG/DL — SIGNIFICANT CHANGE UP
SP GR SPEC: 1.01 — SIGNIFICANT CHANGE UP (ref 1.01–1.02)
UROBILINOGEN FLD QL: 1 MG/DL

## 2019-12-13 PROCEDURE — 87086 URINE CULTURE/COLONY COUNT: CPT

## 2019-12-13 PROCEDURE — 81001 URINALYSIS AUTO W/SCOPE: CPT

## 2019-12-13 PROCEDURE — 99283 EMERGENCY DEPT VISIT LOW MDM: CPT

## 2019-12-13 PROCEDURE — 99284 EMERGENCY DEPT VISIT MOD MDM: CPT

## 2019-12-13 PROCEDURE — 71046 X-RAY EXAM CHEST 2 VIEWS: CPT | Mod: 26

## 2019-12-13 PROCEDURE — 93005 ELECTROCARDIOGRAM TRACING: CPT

## 2019-12-13 PROCEDURE — 81025 URINE PREGNANCY TEST: CPT

## 2019-12-13 PROCEDURE — 93010 ELECTROCARDIOGRAM REPORT: CPT

## 2019-12-13 PROCEDURE — 99283 EMERGENCY DEPT VISIT LOW MDM: CPT | Mod: 25

## 2019-12-13 PROCEDURE — 71046 X-RAY EXAM CHEST 2 VIEWS: CPT

## 2019-12-13 RX ORDER — ACETAMINOPHEN 500 MG
1000 TABLET ORAL ONCE
Refills: 0 | Status: COMPLETED | OUTPATIENT
Start: 2019-12-13 | End: 2019-12-13

## 2019-12-13 RX ORDER — CYCLOBENZAPRINE HYDROCHLORIDE 10 MG/1
10 TABLET, FILM COATED ORAL ONCE
Refills: 0 | Status: COMPLETED | OUTPATIENT
Start: 2019-12-13 | End: 2019-12-13

## 2019-12-13 RX ORDER — CIPROFLOXACIN LACTATE 400MG/40ML
1 VIAL (ML) INTRAVENOUS
Qty: 5 | Refills: 0
Start: 2019-12-13 | End: 2019-12-17

## 2019-12-13 RX ORDER — IBUPROFEN 200 MG
600 TABLET ORAL ONCE
Refills: 0 | Status: COMPLETED | OUTPATIENT
Start: 2019-12-13 | End: 2019-12-13

## 2019-12-13 RX ADMIN — Medication 600 MILLIGRAM(S): at 21:07

## 2019-12-13 RX ADMIN — Medication 100 MILLIGRAM(S): at 21:08

## 2019-12-13 RX ADMIN — CYCLOBENZAPRINE HYDROCHLORIDE 10 MILLIGRAM(S): 10 TABLET, FILM COATED ORAL at 21:08

## 2019-12-13 RX ADMIN — Medication 1000 MILLIGRAM(S): at 21:56

## 2019-12-13 NOTE — ED ADULT TRIAGE NOTE - CHIEF COMPLAINT QUOTE
c/o coughing, back pain and difficulty breathing, c/o fever last night and this morning, took advil with no relief of symptoms, O2 sat in triage 98% on RA, pulse 99

## 2019-12-13 NOTE — ED STATDOCS - PROGRESS NOTE DETAILS
40 y/o female with PMHx of s/p tubal ligation  presents to the ED c/o +productive cough with green sputum x1 month. Endorses associated +upper back pain beginning this morning and +SOB. Denies fever. Took Advil with no relief of sx. Just finished Keflex for UTI, now on Augmentin for upper respiratory sx. Notes she still has +dysuria. No sick contact. NKDA. PCP: Dr. Christopher Johnson.  Rossy Davila PA-C Plan for UA, CXR, UCG.  Lungs clear but cough present.  Rossy Davila PA-C Plan for change to Levaquin PO x 5 days.  CXR Neg.  UA Neg.   DC augmentin.  Rx  Tessalon Perles.

## 2019-12-13 NOTE — ED STATDOCS - ATTENDING CONTRIBUTION TO CARE
Attending Contribution to Care: I, Mitzy Damian, performed the initial face to face bedside interview with this patient regarding history of present illness, review of symptoms and relevant past medical, social and family history.  I completed an independent physical examination.  I was the initial provider who evaluated this patient. I have signed out the follow up of any pending tests (i.e. labs, radiological studies) to the ACP.  I have communicated the patient’s plan of care and disposition with the ACP.

## 2019-12-13 NOTE — ED ADULT NURSE NOTE - NSIMPLEMENTINTERV_GEN_ALL_ED
Implemented All Universal Safety Interventions:  [place] to call system. Call bell, personal items and telephone within reach. Instruct patient to call for assistance. Room bathroom lighting operational. Non-slip footwear when patient is off stretcher. Physically safe environment: no spills, clutter or unnecessary equipment. Stretcher in lowest position, wheels locked, appropriate side rails in place.

## 2019-12-13 NOTE — ED STATDOCS - OBJECTIVE STATEMENT
40 y/o female with PMHx of s/p tubal ligation  presents to the ED c/o +productive cough with green sputum x1 month. Endorses associated +upper back pain beginning this morning and +SOB. Denies fever. Took Advil with no relief of sx. Just finished Keflex for UTI, now on Augmentin for upper respiratory sx. Notes she still has +dysuria. No sick contact. NKDA. PCP: Dr. Christopher Johnson.

## 2019-12-13 NOTE — ED ADULT NURSE NOTE - SUICIDE SCREENING QUESTION 3
[facility]    PATIENT'S NAME: Ranjit Alona FLORES   ATTENDING PHYSICIAN: Pan Templeton MD   OPERATING PHYSICIAN: Francisco Manzano M.D.    PATIENT ACCOUNT#:   [de-identified]    LOCATION:  [address] [place] [address]  MEDICAL RECORD #:   [id number]       DATE OF B hemostasis. The gutters were cleared. The peritoneum was closed with 2-0 Vicryl in a running type fashion. Subfascial hemostasis was achieved. The fascia was closed with 0 Vicryl in a running-type fashion.   Interrupted sutures of plain gut were placed No

## 2019-12-13 NOTE — ED STATDOCS - PATIENT PORTAL LINK FT
You can access the FollowMyHealth Patient Portal offered by [facility] by registering at the following website: [email or web address]. By joining [employer]’s FollowMyHealth portal, you will also be able to view your health information using other applications (apps) compatible with our system.

## 2019-12-14 LAB
CULTURE RESULTS: NO GROWTH — SIGNIFICANT CHANGE UP
SPECIMEN SOURCE: SIGNIFICANT CHANGE UP

## 2020-03-26 ENCOUNTER — EMERGENCY (EMERGENCY)
Facility: HOSPITAL | Age: 40
LOS: 0 days | Discharge: HOME | End: 2020-03-26
Payer: COMMERCIAL

## 2020-03-26 VITALS
SYSTOLIC BLOOD PRESSURE: 112 MMHG | DIASTOLIC BLOOD PRESSURE: 69 MMHG | RESPIRATION RATE: 16 BRPM | OXYGEN SATURATION: 100 % | TEMPERATURE: 98 F | HEART RATE: 94 BPM

## 2020-03-26 DIAGNOSIS — R50.9 FEVER, UNSPECIFIED: ICD-10-CM

## 2020-03-26 DIAGNOSIS — B97.29 OTHER CORONAVIRUS AS THE CAUSE OF DISEASES CLASSIFIED ELSEWHERE: ICD-10-CM

## 2020-03-26 DIAGNOSIS — M79.10 MYALGIA, UNSPECIFIED SITE: ICD-10-CM

## 2020-03-26 DIAGNOSIS — Z45.09 ENCOUNTER FOR ADJUSTMENT AND MANAGEMENT OF OTHER CARDIAC DEVICE: Chronic | ICD-10-CM

## 2020-03-26 DIAGNOSIS — Z98.890 OTHER SPECIFIED POSTPROCEDURAL STATES: Chronic | ICD-10-CM

## 2020-03-26 DIAGNOSIS — R05 COUGH: ICD-10-CM

## 2020-03-26 DIAGNOSIS — Z98.51 TUBAL LIGATION STATUS: Chronic | ICD-10-CM

## 2020-03-26 PROCEDURE — 99283 EMERGENCY DEPT VISIT LOW MDM: CPT

## 2020-03-26 PROCEDURE — 87635 SARS-COV-2 COVID-19 AMP PRB: CPT

## 2020-03-26 NOTE — ED STATDOCS - PATIENT PORTAL LINK FT
You can access the FollowMyHealth Patient Portal offered by [facility] by registering at the following website: [email or web address]. By joining [employer]’s FollowMyHealth portal, you will also be able to view your health information using other applications (apps) compatible with our system.

## 2020-03-26 NOTE — ED STATDOCS - OBJECTIVE STATEMENT
39 yr. old female PMH: None reported presents to ED with fever, cough, runny nose, body aches, sore throat x  1days onsetlast night. . Pt recently exposed to COVID-19. Pt here for testing.  ID #[id number]

## 2020-03-27 LAB — SARS-COV-2 RNA SPEC QL NAA+PROBE: DETECTED

## 2020-04-29 ENCOUNTER — TRANSCRIPTION ENCOUNTER (OUTPATIENT)
Age: 40
End: 2020-04-29

## 2020-05-12 NOTE — ED STATDOCS - CARE PLAN
Tino PA: Pt started on 2L NC. Tolerating well. SOB improved. Labs/medications/imaging ordered by attending. Attending called for transfer of patient to main for closer monitoring. Patient is resting comfortable, no acute distress. Principal Discharge DX:	Chest pain  Secondary Diagnosis:	Cough JOSELITO Cordon: Pt signed out to me from Intake providers to f.u labs and admit to medicine, pt was seen by dr. marmolejo bedside. pt stable bedside, no chest pain at this time. spoke with hospitalist dr. espinoza who accepted pt.

## 2020-08-02 ENCOUNTER — TRANSCRIPTION ENCOUNTER (OUTPATIENT)
Age: 40
End: 2020-08-02

## 2020-08-02 ENCOUNTER — EMERGENCY (EMERGENCY)
Facility: HOSPITAL | Age: 40
LOS: 0 days | Discharge: HOME | End: 2020-08-02
Attending: EMERGENCY MEDICINE
Payer: COMMERCIAL

## 2020-08-02 VITALS
RESPIRATION RATE: 18 BRPM | HEART RATE: 70 BPM | TEMPERATURE: 98 F | SYSTOLIC BLOOD PRESSURE: 103 MMHG | OXYGEN SATURATION: 100 % | DIASTOLIC BLOOD PRESSURE: 68 MMHG

## 2020-08-02 VITALS
OXYGEN SATURATION: 100 % | RESPIRATION RATE: 18 BRPM | TEMPERATURE: 98 F | DIASTOLIC BLOOD PRESSURE: 65 MMHG | SYSTOLIC BLOOD PRESSURE: 110 MMHG | HEART RATE: 68 BPM

## 2020-08-02 DIAGNOSIS — Z45.09 ENCOUNTER FOR ADJUSTMENT AND MANAGEMENT OF OTHER CARDIAC DEVICE: Chronic | ICD-10-CM

## 2020-08-02 DIAGNOSIS — R73.03 PREDIABETES: ICD-10-CM

## 2020-08-02 DIAGNOSIS — Z98.890 OTHER SPECIFIED POSTPROCEDURAL STATES: Chronic | ICD-10-CM

## 2020-08-02 DIAGNOSIS — R06.02 SHORTNESS OF BREATH: ICD-10-CM

## 2020-08-02 DIAGNOSIS — Z98.51 TUBAL LIGATION STATUS: Chronic | ICD-10-CM

## 2020-08-02 DIAGNOSIS — R07.9 CHEST PAIN, UNSPECIFIED: ICD-10-CM

## 2020-08-02 LAB
ADD ON TEST-SPECIMEN IN LAB: SIGNIFICANT CHANGE UP
ALBUMIN SERPL ELPH-MCNC: 3.5 G/DL — SIGNIFICANT CHANGE UP (ref 3.3–5)
ALP SERPL-CCNC: 58 U/L — SIGNIFICANT CHANGE UP (ref 40–120)
ALT FLD-CCNC: 18 U/L — SIGNIFICANT CHANGE UP (ref 12–78)
ANION GAP SERPL CALC-SCNC: 4 MMOL/L — LOW (ref 5–17)
APPEARANCE UR: CLEAR — SIGNIFICANT CHANGE UP
AST SERPL-CCNC: 16 U/L — SIGNIFICANT CHANGE UP (ref 15–37)
BASOPHILS # BLD AUTO: 0.05 K/UL — SIGNIFICANT CHANGE UP (ref 0–0.2)
BASOPHILS NFR BLD AUTO: 0.6 % — SIGNIFICANT CHANGE UP (ref 0–2)
BILIRUB SERPL-MCNC: 0.5 MG/DL — SIGNIFICANT CHANGE UP (ref 0.2–1.2)
BILIRUB UR-MCNC: NEGATIVE — SIGNIFICANT CHANGE UP
BUN SERPL-MCNC: 8 MG/DL — SIGNIFICANT CHANGE UP (ref 7–23)
CALCIUM SERPL-MCNC: 8.4 MG/DL — LOW (ref 8.5–10.1)
CHLORIDE SERPL-SCNC: 109 MMOL/L — HIGH (ref 96–108)
CO2 SERPL-SCNC: 28 MMOL/L — SIGNIFICANT CHANGE UP (ref 22–31)
COLOR SPEC: YELLOW — SIGNIFICANT CHANGE UP
CREAT SERPL-MCNC: 0.73 MG/DL — SIGNIFICANT CHANGE UP (ref 0.5–1.3)
D DIMER BLD IA.RAPID-MCNC: <150 NG/ML DDU — SIGNIFICANT CHANGE UP
DIFF PNL FLD: ABNORMAL
EOSINOPHIL # BLD AUTO: 0.08 K/UL — SIGNIFICANT CHANGE UP (ref 0–0.5)
EOSINOPHIL NFR BLD AUTO: 1 % — SIGNIFICANT CHANGE UP (ref 0–6)
GLUCOSE SERPL-MCNC: 84 MG/DL — SIGNIFICANT CHANGE UP (ref 70–99)
GLUCOSE UR QL: NEGATIVE MG/DL — SIGNIFICANT CHANGE UP
HCT VFR BLD CALC: 36.9 % — SIGNIFICANT CHANGE UP (ref 34.5–45)
HGB BLD-MCNC: 12.3 G/DL — SIGNIFICANT CHANGE UP (ref 11.5–15.5)
IMM GRANULOCYTES NFR BLD AUTO: 0.2 % — SIGNIFICANT CHANGE UP (ref 0–1.5)
KETONES UR-MCNC: NEGATIVE — SIGNIFICANT CHANGE UP
LEUKOCYTE ESTERASE UR-ACNC: ABNORMAL
LIDOCAIN IGE QN: 154 U/L — SIGNIFICANT CHANGE UP (ref 73–393)
LYMPHOCYTES # BLD AUTO: 2.02 K/UL — SIGNIFICANT CHANGE UP (ref 1–3.3)
LYMPHOCYTES # BLD AUTO: 24.1 % — SIGNIFICANT CHANGE UP (ref 13–44)
MCHC RBC-ENTMCNC: 30.7 PG — SIGNIFICANT CHANGE UP (ref 27–34)
MCHC RBC-ENTMCNC: 33.3 GM/DL — SIGNIFICANT CHANGE UP (ref 32–36)
MCV RBC AUTO: 92 FL — SIGNIFICANT CHANGE UP (ref 80–100)
MONOCYTES # BLD AUTO: 0.56 K/UL — SIGNIFICANT CHANGE UP (ref 0–0.9)
MONOCYTES NFR BLD AUTO: 6.7 % — SIGNIFICANT CHANGE UP (ref 2–14)
NEUTROPHILS # BLD AUTO: 5.65 K/UL — SIGNIFICANT CHANGE UP (ref 1.8–7.4)
NEUTROPHILS NFR BLD AUTO: 67.4 % — SIGNIFICANT CHANGE UP (ref 43–77)
NITRITE UR-MCNC: NEGATIVE — SIGNIFICANT CHANGE UP
PH UR: 7 — SIGNIFICANT CHANGE UP (ref 5–8)
PLATELET # BLD AUTO: 301 K/UL — SIGNIFICANT CHANGE UP (ref 150–400)
POTASSIUM SERPL-MCNC: 3.4 MMOL/L — LOW (ref 3.5–5.3)
POTASSIUM SERPL-SCNC: 3.4 MMOL/L — LOW (ref 3.5–5.3)
PROT SERPL-MCNC: 7.1 GM/DL — SIGNIFICANT CHANGE UP (ref 6–8.3)
PROT UR-MCNC: NEGATIVE MG/DL — SIGNIFICANT CHANGE UP
RBC # BLD: 4.01 M/UL — SIGNIFICANT CHANGE UP (ref 3.8–5.2)
RBC # FLD: 11.7 % — SIGNIFICANT CHANGE UP (ref 10.3–14.5)
RBC CASTS # UR COMP ASSIST: ABNORMAL /HPF (ref 0–4)
SODIUM SERPL-SCNC: 141 MMOL/L — SIGNIFICANT CHANGE UP (ref 135–145)
SP GR SPEC: 1.01 — SIGNIFICANT CHANGE UP (ref 1.01–1.02)
TROPONIN I SERPL-MCNC: <0.015 NG/ML — SIGNIFICANT CHANGE UP (ref 0.01–0.04)
TROPONIN I SERPL-MCNC: <0.015 NG/ML — SIGNIFICANT CHANGE UP (ref 0.01–0.04)
UROBILINOGEN FLD QL: NEGATIVE MG/DL — SIGNIFICANT CHANGE UP
WBC # BLD: 8.38 K/UL — SIGNIFICANT CHANGE UP (ref 3.8–10.5)
WBC # FLD AUTO: 8.38 K/UL — SIGNIFICANT CHANGE UP (ref 3.8–10.5)
WBC UR QL: SIGNIFICANT CHANGE UP

## 2020-08-02 PROCEDURE — 85379 FIBRIN DEGRADATION QUANT: CPT

## 2020-08-02 PROCEDURE — 80053 COMPREHEN METABOLIC PANEL: CPT

## 2020-08-02 PROCEDURE — 99284 EMERGENCY DEPT VISIT MOD MDM: CPT

## 2020-08-02 PROCEDURE — 84484 ASSAY OF TROPONIN QUANT: CPT

## 2020-08-02 PROCEDURE — 71046 X-RAY EXAM CHEST 2 VIEWS: CPT

## 2020-08-02 PROCEDURE — 36415 COLL VENOUS BLD VENIPUNCTURE: CPT

## 2020-08-02 PROCEDURE — 71046 X-RAY EXAM CHEST 2 VIEWS: CPT | Mod: 26

## 2020-08-02 PROCEDURE — 81001 URINALYSIS AUTO W/SCOPE: CPT

## 2020-08-02 PROCEDURE — 93005 ELECTROCARDIOGRAM TRACING: CPT

## 2020-08-02 PROCEDURE — 99284 EMERGENCY DEPT VISIT MOD MDM: CPT | Mod: 25

## 2020-08-02 PROCEDURE — 85025 COMPLETE CBC W/AUTO DIFF WBC: CPT

## 2020-08-02 PROCEDURE — 93010 ELECTROCARDIOGRAM REPORT: CPT

## 2020-08-02 PROCEDURE — 83690 ASSAY OF LIPASE: CPT

## 2020-08-02 NOTE — ED ADULT NURSE NOTE - OBJECTIVE STATEMENT
Pt comes in for left upper chest pain and sob since yesterday,. back pain, and upper abdominal pain. denies n/v

## 2020-08-02 NOTE — ED ADULT NURSE NOTE - CHIEF COMPLAINT QUOTE
Pt complains of chest pain with SOB and left arm pain.  Pt denies fever, cough, sick contacts.  EKG done on arrival.

## 2020-08-02 NOTE — ED STATDOCS - CLINICAL SUMMARY MEDICAL DECISION MAKING FREE TEXT BOX
Will evaluate for PE vs ACS. Pt is low risk with no underlying risk factors. Given COVID-19 + in 03/2020 and 04/2020 will evaluate for PE as well. Disposition pending labs and imaging.

## 2020-08-02 NOTE — ED STATDOCS - [facility] ED LOCATION
Patient is a 59y old  Male who presents with a chief complaint of Hematuria (2018 13:47)    HPI:  59M HTN, MARIANA on CPAP, cardiomyopathy, afib, DM, HCV (treated 10y ago), s/p right laparoscopic partial nephrectomy on 2018 for an upper pole RCC, had post op RVR, controlled on meds.  Patient was discharged home, lovenox restarted on  and soon after began to have bright red hematuria without clots. Patient stopped lovenox and states bleeding initially improved, continued coumadin but then began to experience gross hematuria again so came back in.  Also last week he did have episode of OSCAR, seen by cards in office, given IV Cardizem and previous medication regimen resumed (Cartiz  bid and Toprol 100 bid).  Since then no further palpitations.  No chest pain, SOB.  Abdominal pain after surgery improving, appetite coming back.     Allergies:  No Known Allergies    HOME MEDICATIONS: [X ] Reviewed  · 	Cartia  mg/24 hours oral capsule, extended release: Last Dose Taken:  , 1 cap(s) orally 2 times a day  · 	Toprol- mg oral tablet, extended release: Last Dose Taken:  , 1 tab(s) orally 2 times a day  · 	Coumadin 5 mg oral tablet: Last Dose Taken:  , 1 tab(s) orally once a day in pm   · 	glipiZIDE 10 mg oral tablet, extended release: Last Dose Taken:  , 1 tab(s) orally 2 times a day  · 	Onglyza 5 mg oral tablet: Last Dose Taken:  , 1 tab(s) orally once a day  · 	Crestor 5 mg oral tablet: Last Dose Taken:  , 1 tab(s) orally once a day (at bedtime)  · 	magnesium oxide 50 mg 2x/week: Last Dose Taken:    · 	Centrum oral tablet: Last Dose Taken:  , 1 tab(s) orally once a day  last dose   · 	zinc 50 mg orally daily: Last Dose Taken:   ***STOPPED DIG, Vasotec, Norvasc, metformin    MEDICATIONS  (STANDING):  atorvastatin 20 milliGRAM(s) Oral at bedtime  dextrose 5%. 1000 milliLiter(s) (50 mL/Hr) IV Continuous <Continuous>  dextrose 50% Injectable 12.5 Gram(s) IV Push once  dextrose 50% Injectable 25 Gram(s) IV Push once  dextrose 50% Injectable 25 Gram(s) IV Push once  diltiazem    milliGRAM(s) Oral <User Schedule>  docusate sodium 100 milliGRAM(s) Oral three times a day  insulin lispro (HumaLOG) corrective regimen sliding scale   SubCutaneous three times a day before meals  insulin lispro (HumaLOG) corrective regimen sliding scale   SubCutaneous at bedtime  metoprolol succinate  milliGRAM(s) Oral two times a day  sodium chloride 0.9%. 1000 milliLiter(s) (125 mL/Hr) IV Continuous <Continuous>    MEDICATIONS  (PRN):  acetaminophen   Tablet. 650 milliGRAM(s) Oral every 6 hours PRN Mild Pain (1 - 3)  dextrose Gel 1 Dose(s) Oral once PRN Blood Glucose LESS THAN 70 milliGRAM(s)/deciliter  glucagon  Injectable 1 milliGRAM(s) IntraMuscular once PRN Glucose LESS THAN 70 milligrams/deciliter  oxyCODONE    5 mG/acetaminophen 325 mG 1 Tablet(s) Oral every 4 hours PRN Moderate Pain  oxyCODONE    5 mG/acetaminophen 325 mG 2 Tablet(s) Oral every 6 hours PRN Severe Pain    PAST MEDICAL & SURGICAL HISTORY:  Atrial fibrillation  Cardiomyopathy  MARIANA on CPAP  Other specified disorders of kidney and ureter: left kidney mass  Diabetes mellitus: type 2  Hypertension  H/O partial nephrectomy: Right  H/O partial nephrectomy  H/O colonoscopy with polypectomy: 2018  History of tonsillectomy     SOCIAL HISTORY:  , vu, remote smoking/alcohol    FAMILY HISTORY:  Family history of colon cancer  Diabetes mellitus (Sibling)    REVIEW OF SYSTEMS:  CONSTITUTIONAL: No fever, weight loss, or fatigue  EYES: No eye pain, visual disturbances, or discharge  ENMT:  No difficulty hearing, tinnitus, vertigo; No sinus or throat pain  NECK: No pain or stiffness  BREASTS: No pain, masses, or nipple discharge  RESPIRATORY: No cough, wheezing, chills or hemoptysis; No shortness of breath  CARDIOVASCULAR: No chest pain, dizziness, or leg swelling  GASTROINTESTINAL: No abdominal or epigastric pain. No nausea, vomiting, or hematemesis; No diarrhea or constipation. No melena or hematochezia.  GENITOURINARY: per HPI  NEUROLOGICAL: No headaches, memory loss, loss of strength, numbness, or tremors  SKIN: No itching, burning, rashes, or lesions   LYMPH NODES: No enlarged glands  ENDOCRINE: No heat or cold intolerance; No hair loss  MUSCULOSKELETAL: No muscle or back pain  PSYCHIATRIC: No depression, anxiety, mood swings, or difficulty sleeping  HEME/LYMPH: No easy bruising, or bleeding gums  ALLERGY AND IMMUNOLOGIC: No hives or eczema  [ X ] All other ROS negative  [  ] Unable to obtain due to poor mental status    Vital Signs Last 24 Hrs  T(C): 37.2 (2018 13:44), Max: 38.3 (2018 19:29)  T(F): 99 (2018 13:44), Max: 101 (2018 19:29)  HR: 85 (2018 13:44) (73 - 104)  BP: 142/78 (2018 13:44) (131/82 - 163/100)  RR: 17 (2018 13:44) (17 - 18)  SpO2: 97% (2018 13:44) (93% - 98%)    PHYSICAL EXAM:  GENERAL: NAD, well-groomed, well-developed  HEAD:  Atraumatic, Normocephalic  EYES: EOMI, PERRLA, conjunctiva and sclera clear  ENMT: Moist mucous membranes  NECK: Supple, No JVD  RESPIRATORY: Clear to auscultation bilaterally; No rales, rhonchi, wheezing, or rubs  CARDIOVASCULAR: Regular rate and rhythm; No murmurs, rubs, or gallops  GASTROINTESTINAL: Soft, Nontender, Nondistended; Bowel sounds present; incisions c/d/i  GENITOURINARY: Not examined  EXTREMITIES:  2+ Peripheral Pulses, No clubbing, cyanosis, or edema  NERVOUS SYSTEM:  Alert & Oriented X3; Moving all 4 extremities; No gross sensory deficits  HEME/LYMPH: No lymphadenopathy noted  SKIN: No rashes or lesions  PSYCH: calm, appropriate    LABS:             11.3   11.26 )-----------( 454      ( 2018 09:00 )             34.7     140  |  103  |  14  ----------------------------<  231<H>  3.8   |  22  |  1.55<H>    Ca    8.6      2018 09:00    TPro  8.2  /  Alb  3.6  /  TBili  0.4  /  DBili  x   /  AST  38  /  ALT  49<H>  /  AlkPhos  117  04-    PT/INR - ( 2018 09:00 )   PT: 15.6 SEC;   INR: 1.35     PTT - ( 2018 09:00 )  PTT:28.1 SEC    Urinalysis Basic - ( 2018 11:39 )  Color: RED / Appearance: TURBID / S.018 / pH: 6.0  Gluc: NEGATIVE / Ketone: TRACE  / Bili: NEGATIVE / Urobili: NORMAL mg/dL   Blood: LARGE / Protein: 100 mg/dL / Nitrite: NEGATIVE   Leuk Esterase: SMALL / RBC: >50 / WBC 10-25   Sq Epi: x / Non Sq Epi: x / Bacteria: x    CAPILLARY BLOOD GLUCOSE  190 (2018 13:24)  POCT Blood Glucose.: 222 mg/dL (2018 09:01)    RADIOLOGY & ADDITIONAL STUDIES:    EKG:   Personally Reviewed:  [ ] YES     Imaging:   Personally Reviewed:  [ ] YES               Consultant(s) notes reviewed:    Care Discussed with Consultant(s)/Other Providers: urology re overall care pectoral region Patient is a 59y old  Male who presents with a chief complaint of Hematuria (2018 13:47)    HPI:  59M HTN, MARIANA on CPAP, cardiomyopathy, afib, DM, HCV (treated 10y ago), s/p right laparoscopic partial nephrectomy on 2018 for an upper pole RCC, had post op RVR, controlled on meds.  Patient was discharged home, lovenox restarted on  and soon after began to have bright red hematuria without clots. Patient stopped lovenox and states bleeding initially improved, continued coumadin but then began to experience gross hematuria again so came back in.  Also last week he did have episode of OSCAR, seen by cards in office, given IV Cardizem and previous medication regimen resumed (Cartiz  bid and Toprol 100 bid).  Since then no further palpitations.  No chest pain, SOB.  Abdominal pain after surgery improving, appetite coming back.     Allergies:  No Known Allergies    HOME MEDICATIONS: [X ] Reviewed  · 	Cartia  mg/24 hours oral capsule, extended release: Last Dose Taken:  , 1 cap(s) orally 2 times a day  · 	Toprol- mg oral tablet, extended release: Last Dose Taken:  , 1 tab(s) orally 2 times a day  · 	Coumadin 5 mg oral tablet: Last Dose Taken:  , 1 tab(s) orally once a day in pm   · 	glipiZIDE 10 mg oral tablet, extended release: Last Dose Taken:  , 1 tab(s) orally 2 times a day  · 	Onglyza 5 mg oral tablet: Last Dose Taken:  , 1 tab(s) orally once a day  · 	Crestor 5 mg oral tablet: Last Dose Taken:  , 1 tab(s) orally once a day (at bedtime)  · 	magnesium oxide 50 mg 2x/week: Last Dose Taken:    · 	Centrum oral tablet: Last Dose Taken:  , 1 tab(s) orally once a day  last dose   · 	zinc 50 mg orally daily: Last Dose Taken:   ***STOPPED DIG, Vasotec, Norvasc, metformin    MEDICATIONS  (STANDING):  atorvastatin 20 milliGRAM(s) Oral at bedtime  dextrose 5%. 1000 milliLiter(s) (50 mL/Hr) IV Continuous <Continuous>  dextrose 50% Injectable 12.5 Gram(s) IV Push once  dextrose 50% Injectable 25 Gram(s) IV Push once  dextrose 50% Injectable 25 Gram(s) IV Push once  diltiazem    milliGRAM(s) Oral <User Schedule>  docusate sodium 100 milliGRAM(s) Oral three times a day  insulin lispro (HumaLOG) corrective regimen sliding scale   SubCutaneous three times a day before meals  insulin lispro (HumaLOG) corrective regimen sliding scale   SubCutaneous at bedtime  metoprolol succinate  milliGRAM(s) Oral two times a day  sodium chloride 0.9%. 1000 milliLiter(s) (125 mL/Hr) IV Continuous <Continuous>    MEDICATIONS  (PRN):  acetaminophen   Tablet. 650 milliGRAM(s) Oral every 6 hours PRN Mild Pain (1 - 3)  dextrose Gel 1 Dose(s) Oral once PRN Blood Glucose LESS THAN 70 milliGRAM(s)/deciliter  glucagon  Injectable 1 milliGRAM(s) IntraMuscular once PRN Glucose LESS THAN 70 milligrams/deciliter  oxyCODONE    5 mG/acetaminophen 325 mG 1 Tablet(s) Oral every 4 hours PRN Moderate Pain  oxyCODONE    5 mG/acetaminophen 325 mG 2 Tablet(s) Oral every 6 hours PRN Severe Pain    PAST MEDICAL & SURGICAL HISTORY:  Atrial fibrillation  Cardiomyopathy  MARIANA on CPAP  Other specified disorders of kidney and ureter: left kidney mass  Diabetes mellitus: type 2  Hypertension  H/O partial nephrectomy: Right  H/O partial nephrectomy  H/O colonoscopy with polypectomy: 2018  History of tonsillectomy     SOCIAL HISTORY:  , vu, remote smoking/alcohol    FAMILY HISTORY:  Family history of colon cancer  Diabetes mellitus (Sibling)    REVIEW OF SYSTEMS:  CONSTITUTIONAL: No fever, weight loss, or fatigue  EYES: No eye pain, visual disturbances, or discharge  ENMT:  No difficulty hearing, tinnitus, vertigo; No sinus or throat pain  NECK: No pain or stiffness  BREASTS: No pain, masses, or nipple discharge  RESPIRATORY: No cough, wheezing, chills or hemoptysis; No shortness of breath  CARDIOVASCULAR: No chest pain, dizziness, or leg swelling  GASTROINTESTINAL: No abdominal or epigastric pain. No nausea, vomiting, or hematemesis; No diarrhea or constipation. No melena or hematochezia.  GENITOURINARY: per HPI  NEUROLOGICAL: No headaches, memory loss, loss of strength, numbness, or tremors  SKIN: No itching, burning, rashes, or lesions   LYMPH NODES: No enlarged glands  ENDOCRINE: No heat or cold intolerance; No hair loss  MUSCULOSKELETAL: No muscle or back pain  PSYCHIATRIC: No depression, anxiety, mood swings, or difficulty sleeping  HEME/LYMPH: No easy bruising, or bleeding gums  ALLERGY AND IMMUNOLOGIC: No hives or eczema  [ X ] All other ROS negative  [  ] Unable to obtain due to poor mental status    Vital Signs Last 24 Hrs  T(C): 37.2 (2018 13:44), Max: 38.3 (2018 19:29)  T(F): 99 (2018 13:44), Max: 101 (2018 19:29)  HR: 85 (2018 13:44) (73 - 104)  BP: 142/78 (2018 13:44) (131/82 - 163/100)  RR: 17 (2018 13:44) (17 - 18)  SpO2: 97% (2018 13:44) (93% - 98%)    PHYSICAL EXAM:  GENERAL: NAD, well-groomed, well-developed  HEAD:  Atraumatic, Normocephalic  EYES: EOMI, PERRLA, conjunctiva and sclera clear  ENMT: Moist mucous membranes  NECK: Supple, No JVD  RESPIRATORY: Clear to auscultation bilaterally; No rales, rhonchi, wheezing, or rubs  CARDIOVASCULAR: S1. S2, No murmurs, rubs, or gallops  GASTROINTESTINAL: Soft, Nontender, Nondistended; Bowel sounds present; incisions c/d/i  GENITOURINARY: Not examined  EXTREMITIES:  2+ Peripheral Pulses, No clubbing, cyanosis, or edema  NERVOUS SYSTEM:  Alert & Oriented X3; Moving all 4 extremities; No gross sensory deficits  HEME/LYMPH: No lymphadenopathy noted  SKIN: No rashes or lesions  PSYCH: calm, appropriate    LABS:             11.3   11.26 )-----------( 454      ( 2018 09:00 )             34.7     140  |  103  |  14  ----------------------------<  231<H>  3.8   |  22  |  1.55<H>    Ca    8.6      2018 09:00    TPro  8.2  /  Alb  3.6  /  TBili  0.4  /  DBili  x   /  AST  38  /  ALT  49<H>  /  AlkPhos  117  04-26    PT/INR - ( 2018 09:00 )   PT: 15.6 SEC;   INR: 1.35     PTT - ( 2018 09:00 )  PTT:28.1 SEC    Urinalysis Basic - ( 2018 11:39 )  Color: RED / Appearance: TURBID / S.018 / pH: 6.0  Gluc: NEGATIVE / Ketone: TRACE  / Bili: NEGATIVE / Urobili: NORMAL mg/dL   Blood: LARGE / Protein: 100 mg/dL / Nitrite: NEGATIVE   Leuk Esterase: SMALL / RBC: >50 / WBC 10-25   Sq Epi: x / Non Sq Epi: x / Bacteria: x    CAPILLARY BLOOD GLUCOSE  190 (2018 13:24)  POCT Blood Glucose.: 222 mg/dL (2018 09:01)    RADIOLOGY & ADDITIONAL STUDIES:    EKG:   Personally Reviewed:  [ ] YES     Imaging:   Personally Reviewed:  [ ] YES               Consultant(s) notes reviewed:    Care Discussed with Consultant(s)/Other Providers: urology re overall care

## 2020-08-02 NOTE — ED STATDOCS - CARE PROVIDER_API CALL
Heath Jack  CARDIOVASCULAR DISEASE  [address], [address]  [place] [address]  Phone: [phone]  Fax: [phone]  Follow Up Time: 1-3 Days

## 2020-08-02 NOTE — ED STATDOCS - OBJECTIVE STATEMENT
39 YOF PMHx pre-diabetes, syncope presents to the ED c/o dyspnea, chest pain, abd pain and back pain. Pt had gradual onset of difficulty breathing yesterday with onset of left sided chest pain today. Chest pain radiates to left upper extremity and pt feels here left hand is numb. Denies fever, chills. No recent heavy lifting or straining.

## 2020-08-02 NOTE — ED STATDOCS - PROGRESS NOTE DETAILS
Story not suspicious for acs.  Low risk HEART score.  EKG unremarkable.  Trop x2 negative - sufficient for eval given  timing  of symptoms.  Low risk PE and dimer negative.  No e/o ptx/pna/carditis.  Story not c/w dissection - below threshold for further w/u.  Stable for outpatient f/u.  D/c home with strict return precautions and prompt outpatient f/u.

## 2020-08-02 NOTE — ED STATDOCS - CROS ED NEURO POS
+left hand numbness Patient complaining of L abdomen and flank pain  Was told this is continued from yesterday  Spoke with Balwinder Phillips from [facility] and was told that it could possibly be radiating from the incision site or that patient has to have a BM  Patient is now agreeable to take the oxycodone  Jeralene Chew said she would also order some miralax PRN if the patient needs this  Will continue to monitor closely

## 2020-08-02 NOTE — ED ADULT NURSE NOTE - NSIMPLEMENTINTERV_GEN_ALL_ED
Implemented All Universal Safety Interventions:  [place] to call system. Call bell, personal items and telephone within reach. Instruct patient to call for assistance. Room bathroom lighting operational. Non-slip footwear when patient is off stretcher. Physically safe environment: no spills, clutter or unnecessary equipment. Stretcher in lowest position, wheels locked, appropriate side rails in place.

## 2020-08-08 NOTE — ED STATDOCS - ATTENDING CONTRIBUTION TO CARE
No I provided initial assessment of patient and had discussion of plan with ACP and was available for further consultation throughout ed stay.

## 2020-08-17 ENCOUNTER — TRANSCRIPTION ENCOUNTER (OUTPATIENT)
Age: 40
End: 2020-08-17

## 2020-12-28 ENCOUNTER — RESULT REVIEW (OUTPATIENT)
Age: 40
End: 2020-12-28

## 2021-01-16 ENCOUNTER — EMERGENCY (EMERGENCY)
Facility: HOSPITAL | Age: 41
LOS: 0 days | Discharge: HOME | End: 2021-01-16
Attending: STUDENT IN AN ORGANIZED HEALTH CARE EDUCATION/TRAINING PROGRAM
Payer: COMMERCIAL

## 2021-01-16 VITALS
HEART RATE: 109 BPM | TEMPERATURE: 99 F | DIASTOLIC BLOOD PRESSURE: 67 MMHG | RESPIRATION RATE: 18 BRPM | SYSTOLIC BLOOD PRESSURE: 100 MMHG | OXYGEN SATURATION: 99 %

## 2021-01-16 VITALS — WEIGHT: 134.92 LBS | HEIGHT: 72 IN

## 2021-01-16 DIAGNOSIS — B33.8 OTHER SPECIFIED VIRAL DISEASES: ICD-10-CM

## 2021-01-16 DIAGNOSIS — R73.03 PREDIABETES: ICD-10-CM

## 2021-01-16 DIAGNOSIS — R05 COUGH: ICD-10-CM

## 2021-01-16 DIAGNOSIS — K21.9 GASTRO-ESOPHAGEAL REFLUX DISEASE WITHOUT ESOPHAGITIS: ICD-10-CM

## 2021-01-16 DIAGNOSIS — Z86.16 PERSONAL HISTORY OF COVID-19: ICD-10-CM

## 2021-01-16 DIAGNOSIS — Z98.51 TUBAL LIGATION STATUS: Chronic | ICD-10-CM

## 2021-01-16 DIAGNOSIS — Z98.890 OTHER SPECIFIED POSTPROCEDURAL STATES: Chronic | ICD-10-CM

## 2021-01-16 DIAGNOSIS — Z98.51 TUBAL LIGATION STATUS: ICD-10-CM

## 2021-01-16 DIAGNOSIS — R11.0 NAUSEA: ICD-10-CM

## 2021-01-16 DIAGNOSIS — Z45.09 ENCOUNTER FOR ADJUSTMENT AND MANAGEMENT OF OTHER CARDIAC DEVICE: Chronic | ICD-10-CM

## 2021-01-16 LAB — SARS-COV-2 RNA SPEC QL NAA+PROBE: SIGNIFICANT CHANGE UP

## 2021-01-16 PROCEDURE — U0005: CPT

## 2021-01-16 PROCEDURE — U0003: CPT

## 2021-01-16 PROCEDURE — 99283 EMERGENCY DEPT VISIT LOW MDM: CPT

## 2021-01-16 RX ORDER — FAMOTIDINE 10 MG/ML
1 INJECTION INTRAVENOUS
Qty: 7 | Refills: 0
Start: 2021-01-16 | End: 2021-01-22

## 2021-01-16 NOTE — ED STATDOCS - NSFOLLOWUPINSTRUCTIONS_ED_ALL_ED_FT
Enfermedades virales en los adultos  (Viral Illness, Adult)  Los virus son microbios diminutos que entran en el organismo de david persona y causan enfermedades. Hay muchos tipos de virus diferentes y causan muchas clases de enfermedades. Las enfermedades virales pueden ser leves o graves. Pueden afectar diferentes partes del cuerpo.  Las enfermedades frecuentes causadas por virus incluyen los resfríos y la gripe. Además, las enfermedades virales abarcan brady clínicos graves, kalpana el VIH/sida (virus de inmunodeficiencia humana/síndrome de inmunodeficiencia adquirida). Se torres identificado unos pocos virus asociados con determinados tipos de cáncer.  ¿CUÁLES SON LAS CAUSAS?  Muchos tipos de virus pueden causar enfermedades. Los virus invaden las células del organismo, se multiplican y provocan la disfunción o la muerte de las células infectadas. Cuando la célula muere, libera más virus. Cuando esto ocurre, aparecen síntomas de la enfermedad, y el virus sigue diseminándose a otras células. Si el virus asume la función de la célula, puede hacer que esta se divida y crezca fuera de control, y parth es el marleny en el que un virus causa cáncer.  Los diferentes virus ingresan al organismo de distintas formas. Puede contraer un virus de la siguiente manera:  Al ingerir alimentos o beber agua contaminados con el virus.Al inhalar gotitas que david persona infectada liberó en el aire al toser o estornudar.Al tocar david superficie contaminada con el virus y luego llevarse la mano a la boca, la nariz o los ojos.Al ser kiera por un insecto o mordido por un animal que son portadores del virus.Al tener contacto sexual con david persona infectada por el virus.Al tener contacto con dameon o líquidos que contienen el virus, ya sea a través de un sofie abierto o mack david transfusión.Si el virus ingresa al organismo, el sistema de defensa del cuerpo (sistema inmunitario) intentará combatirlo. Puede correr un riesgo más alto de tener david enfermedad viral si tiene el sistema inmunitario debilitado.  ¿CUÁLES SON LOS SIGNOS O LOS SÍNTOMAS?  Los síntomas varían en función del tipo de virus y de la ubicación de las células que parth invade. Los síntomas frecuentes de los principales tipos de enfermedades virales incluyen los siguientes:  Virus del resfrío y de la gripe   Fiebre.Dolor de britt.Dolor de garganta.Nica musculares.Congestión nasal.Tos.Virus del aparato digestivo (gastrointestinales)  Fiebre.Dolor abdominal.Náuseas.Diarrea.Virus hepáticos (hepatitis)   Pérdida del apetito.Cansancio.Juan amarillento de la piel (ictericia).Virus del cerebro y la médula cam   Fiebre.Dolor de britt.Rigidez en el waylon.Náuseas y vómitos.Confusión o somnolencia.Virus de la piel   Verrugas.Picazón.Erupción cutánea.Virus de transmisión sexual   Secreción.Hinchazón.Enrojecimiento.Erupción cutánea.¿CÓMO SE TRATA ESTA AFECCIÓN?  Los virus pueden ser difíciles de tratar porque se hospedan en las células. Los antibióticos no tratan los virus porque no llegan al interior de las células. El tratamiento de david enfermedad viral puede incluir lo siguiente:  Descansar y beber abundantes líquidos.Medicamentos para aliviar los síntomas. Estos pueden incluir medicamentos de venta vega para el dolor y la fiebre, medicamentos para la tos o la congestión y medicamentos para aliviar la diarrea.Medicamentos antivirales. Estos fármacos están disponibles únicamente para determinados tipos de virus. Pueden ayudar a aliviar los síntomas de la gripe, si se teresa apenas comienza el cuadro. También hay antivirales para la hepatitis y el VIH/sida.Algunas enfermedades virales pueden evitarse con vacunas. Un ejemplo frecuente es la vacuna antigripal.  SIGA ESTAS INDICACIONES EN BROWN CASA:  Medicamentos   [place] los medicamentos de venta vega y los recetados solamente kalpana se lo haya indicado el médico.Si le recetaron un antiviral, tómelo kalpana se lo haya indicado el médico. No deje de [place] los medicamentos aunque comience a sentirse mejor.Infórmese sobre cuándo los antibióticos son necesarios y cuándo no. Los antibióticos no combaten a los virus. Si el médico bryce que usted puede tener david infección bacteriana así kalpana david viral, keith vez le receten un antibiótico.  No solicite david receta de antibióticos si le torres diagnosticado david enfermedad viral. Eso no hará que la enfermedad pase más rápidamente.[place] antibióticos con frecuencia cuando no son necesarios puede derivar en resistencia a los antibióticos. Cuando esto ocurre, el medicamento pierde brown eficacia contra las bacterias que normalmente combate.Instrucciones generales   Laar suficiente líquido para mantener la orina dorina o de color amarillo pálido.Descanse todo lo que pueda.Retome james actividades normales kalpana se lo haya indicado el médico. Pregúntele al médico qué actividades son seguras para usted.Concurra a todas las visitas de control kalpana se lo haya indicado el médico. [place] es importante.¿CÓMO SE YEFRI ESTO?  [place] estas medidas para reducir el riesgo de tener david infección viral:  Consuma david dieta temo y descanse mucho.Lávese las debora frecuentemente con agua y jabón. [place] es especialmente importante cuando está en lugares públicos. Use desinfectante para debora si no dispone de agua y jabón.Evite el contacto cercano con amigos y familiares que tengan david infección viral.Si viaja a las regiones donde las infecciones virales gastrointestinales son frecuentes, no tome agua ni coma alimentos crudos.Manténgase al día con las vacunas. Vacúnese contra la gripe todos los años kalpana se lo haya indicado el médico.No comparta cepillos de dientes, cortaúñas, rasuradoras o agujas con otras personas.Siempre tenga sexo con protección.COMUNÍQUESE CON UN MÉDICO SI:  Tiene síntomas de david enfermedad viral que no desaparecen.Los síntomas regresan después de yousuf desaparecido.Los síntomas empeoran.SOLICITE AYUDA DE INMEDIATO SI:  Tiene dificultad para respirar.Siente un dolor de britt intenso o rigidez en el waylon.Tiene vómitos ehsan o dolor abdominal.Esta información no tiene kalpana fin reemplazar el consejo del médico. Asegúrese de hacerle al médico cualquier pregunta que tenga.

## 2021-01-16 NOTE — ED STATDOCS - PATIENT PORTAL LINK FT
You can access the FollowMyHealth Patient Portal offered by [facility] by registering at the following website: [email or web address]. By joining [employer]’s FollowMyHealth portal, you will also be able to view your health information using other applications (apps) compatible with our system.

## 2021-01-16 NOTE — ED STATDOCS - GASTROINTESTINAL, MLM
abdomen soft, non-tender, and non-distended. Bowel sounds present. abdomen soft,  and non-distended. Bowel sounds present. mild diffused abd tenderness.

## 2021-01-16 NOTE — ED STATDOCS - CLINICAL SUMMARY MEDICAL DECISION MAKING FREE TEXT BOX
here with viral illness onset last night, requesting COVID-19 swab, discussed symptomatic treatment, will d/c.

## 2021-01-16 NOTE — ED STATDOCS - PMH
Have seen patient while reviewing H&P, no changes noted. Will consult internal medicine if needed.     Prediabetes    Syncope, unspecified syncope type  2015, had ILR placed

## 2021-01-16 NOTE — ED ADULT TRIAGE NOTE - CHIEF COMPLAINT QUOTE
PT to ED for nausea, acid reflux, cough, fever, chills, and body aches. COVID + in April. Denies sob and chest pain. Repors cough and chills began last night. PT a&ox4, no distress.  ID#[id number]

## 2021-01-16 NOTE — ED STATDOCS - PROGRESS NOTE DETAILS
Likely viral syndrome, will swab, supportive care dicussed, PMD FU. -Stephen Kendall PA-C 41 y/o F presents with fever x 1 day. pt reports fever, nausea, body aches, reflux, cough. Sx improved with tylenol. Denies v/d, Cp, SOB, leg swelling, or other complaints at this time  heart:[id number], lung: CTAB/L, abd: soft nd, NTTP. -Stephen Kendall PA-C

## 2021-01-16 NOTE — ED STATDOCS - ATTENDING CONTRIBUTION TO CARE
I, Felipa Rodríguez DO, personally saw the patient with ACP.  I have personally performed a face to face diagnostic evaluation on this patient and formulated the patient plan. The case was discussed with, and handed off to ACP who followed the case through to the re-evaluation and disposition.

## 2021-01-16 NOTE — ED STATDOCS - OBJECTIVE STATEMENT
39 y/o female with pmhx of preDM presents to the ED c/o nausea, acid reflux, cough, fever, chills and body aches. Pt was COVID+ in April 41 y/o female with pmhx of preDM presents to the ED c/o nausea, acid reflux, cough, fever, chills and body aches. pts symptoms began last night. Pt was COVID+ in April. pt took Tylenol with minimal relief of symptoms. No SOB, chest pain, diarrhea.  ID: [id number]

## 2021-01-26 ENCOUNTER — APPOINTMENT (OUTPATIENT)
Dept: MAMMOGRAPHY | Facility: CLINIC | Age: 41
End: 2021-01-26

## 2021-01-26 ENCOUNTER — APPOINTMENT (OUTPATIENT)
Dept: ULTRASOUND IMAGING | Facility: CLINIC | Age: 41
End: 2021-01-26

## 2021-02-26 ENCOUNTER — OUTPATIENT (OUTPATIENT)
Dept: OUTPATIENT SERVICES | Facility: HOSPITAL | Age: 41
LOS: 1 days | End: 2021-02-26

## 2021-02-26 ENCOUNTER — APPOINTMENT (OUTPATIENT)
Dept: MAMMOGRAPHY | Facility: CLINIC | Age: 41
End: 2021-02-26

## 2021-02-26 DIAGNOSIS — Z98.890 OTHER SPECIFIED POSTPROCEDURAL STATES: Chronic | ICD-10-CM

## 2021-02-26 DIAGNOSIS — Z98.51 TUBAL LIGATION STATUS: Chronic | ICD-10-CM

## 2021-02-26 DIAGNOSIS — Z45.09 ENCOUNTER FOR ADJUSTMENT AND MANAGEMENT OF OTHER CARDIAC DEVICE: Chronic | ICD-10-CM

## 2021-02-26 DIAGNOSIS — Z00.8 ENCOUNTER FOR OTHER GENERAL EXAMINATION: ICD-10-CM

## 2021-03-13 ENCOUNTER — APPOINTMENT (OUTPATIENT)
Dept: ULTRASOUND IMAGING | Facility: CLINIC | Age: 41
End: 2021-03-13
Payer: COMMERCIAL

## 2021-03-13 ENCOUNTER — OUTPATIENT (OUTPATIENT)
Dept: OUTPATIENT SERVICES | Facility: HOSPITAL | Age: 41
LOS: 1 days | End: 2021-03-13
Payer: COMMERCIAL

## 2021-03-13 DIAGNOSIS — Z98.890 OTHER SPECIFIED POSTPROCEDURAL STATES: Chronic | ICD-10-CM

## 2021-03-13 DIAGNOSIS — Z00.8 ENCOUNTER FOR OTHER GENERAL EXAMINATION: ICD-10-CM

## 2021-03-13 DIAGNOSIS — Z45.09 ENCOUNTER FOR ADJUSTMENT AND MANAGEMENT OF OTHER CARDIAC DEVICE: Chronic | ICD-10-CM

## 2021-03-13 DIAGNOSIS — Z98.51 TUBAL LIGATION STATUS: Chronic | ICD-10-CM

## 2021-03-13 PROCEDURE — 76830 TRANSVAGINAL US NON-OB: CPT

## 2021-03-13 PROCEDURE — 76830 TRANSVAGINAL US NON-OB: CPT | Mod: 26

## 2021-03-13 PROCEDURE — 76856 US EXAM PELVIC COMPLETE: CPT

## 2021-03-13 PROCEDURE — 76856 US EXAM PELVIC COMPLETE: CPT | Mod: 26

## 2021-03-20 ENCOUNTER — EMERGENCY (EMERGENCY)
Facility: HOSPITAL | Age: 41
LOS: 0 days | Discharge: HOME | End: 2021-03-20
Attending: EMERGENCY MEDICINE
Payer: COMMERCIAL

## 2021-03-20 VITALS
OXYGEN SATURATION: 100 % | TEMPERATURE: 98 F | DIASTOLIC BLOOD PRESSURE: 63 MMHG | SYSTOLIC BLOOD PRESSURE: 111 MMHG | HEART RATE: 74 BPM | RESPIRATION RATE: 18 BRPM

## 2021-03-20 VITALS — WEIGHT: 134.92 LBS | HEIGHT: 72 IN

## 2021-03-20 DIAGNOSIS — R73.03 PREDIABETES: ICD-10-CM

## 2021-03-20 DIAGNOSIS — Z45.09 ENCOUNTER FOR ADJUSTMENT AND MANAGEMENT OF OTHER CARDIAC DEVICE: Chronic | ICD-10-CM

## 2021-03-20 DIAGNOSIS — Z98.890 OTHER SPECIFIED POSTPROCEDURAL STATES: Chronic | ICD-10-CM

## 2021-03-20 DIAGNOSIS — Z98.51 TUBAL LIGATION STATUS: Chronic | ICD-10-CM

## 2021-03-20 DIAGNOSIS — L03.213 PERIORBITAL CELLULITIS: ICD-10-CM

## 2021-03-20 DIAGNOSIS — R21 RASH AND OTHER NONSPECIFIC SKIN ERUPTION: ICD-10-CM

## 2021-03-20 PROCEDURE — 99283 EMERGENCY DEPT VISIT LOW MDM: CPT

## 2021-03-20 RX ORDER — CEPHALEXIN 500 MG
1 CAPSULE ORAL
Qty: 28 | Refills: 0
Start: 2021-03-20 | End: 2021-03-26

## 2021-03-20 NOTE — ED STATDOCS - PROGRESS NOTE DETAILS
history with assistance from  ID# [id number]. 41 y/o F with PMH of pre-DM presents with bilateral eye rash x 2 days. Reports rash as red, warm, +tearing. Denies fever, chills, blurry vision. Rash not described as itchy. Does not use contact lenses. Has not applied new make up, lotions, soaps to irritated area. PE: Patient itched eyelids on multiple instances. +mild erythema on bilateral upper and lower eyelids. No edema surrounding eyes. No TTP to erythematous skin. PERRLA, EOMI. Fluorescein stain: OD: negative, OS: negative. Nares patent. Cardiac: s1s2, RRR. Lungs: CTAB. A/p: Contact dermatitis vs. preseptal cellulitis. Will advise topical treatments x 24 hours. If symptoms do not improve then initiate antibiotics. dc home. - Raf Enriquez PA-C

## 2021-03-20 NOTE — ED STATDOCS - EYES, MLM
clear bilaterally.  Pupils equal, round, and reactive to light. clear bilaterally.  Pupils equal, round, and reactive to light. Mild erythema around both eyes. no occular noted. EOMI without pain. No lid edema.

## 2021-03-20 NOTE — ED STATDOCS - OBJECTIVE STATEMENT
39 y/o female with a PMHx of prediabetes, syncope presents to the ED c/o rash around b/l eyes starting yesterday. +redness, +warmth. Has happened once before in the past. Denies new makeup use, eye discharge/drainage, fevers, chills, itchiness. No contact use. No other complaints at this time.  ID#: [id number].

## 2021-03-20 NOTE — ED ADULT TRIAGE NOTE - CHIEF COMPLAINT QUOTE
c/o bilateral mango eye irritation for past 2 days, has not taken medication for irritation HX; denies

## 2021-03-20 NOTE — ED STATDOCS - CLINICAL SUMMARY MEDICAL DECISION MAKING FREE TEXT BOX
Likely preseptal cellulitis.. No evidence of orbital cellulitis. Will stain eye given eye pain. Likely d/c home with return precautions. Possibly preseptal cellulitis vs contact dermatitis.  Atypical that it would affect both eyes.  Perhaps from mask wearing.   No evidence of orbital cellulitis. Will stain eye given eye pain. Likely d/c home with return precautions.

## 2021-03-20 NOTE — ED STATDOCS - NSFOLLOWUPINSTRUCTIONS_ED_ALL_ED_FT
APLICAR LOCIÓN TÓPICA A LA PIEL IRRITADA EDITA 24 HORAS. SI LOS SÍNTOMAS EMPEORAN, ENTONCES COMIENCE LOS ANTIBIÓTICOS MAÑANA.    APPLY TOPICAL LOTION TO IRRITATED SKIN FOR 24 HOURS. IF SYMPTOMS WORSEN THEN START ANTIBIOTICS TOMORROW.    Celulitis preseptal en los adultos    Preseptal Cellulitis, Adult       La celulitis preseptal es david infección en el párpado y en el tejido que rodea el francine (wanda periocular). La infección causa dolor, hinchazón y enrojecimiento. Esta afección también se conoce kalpana celulitis periorbitaria.    En la mayoría de los casos, la afección puede tratarse con antibióticos en lopez casa. Es importante tratar la celulitis preseptal inmediatamente de modo que no empeore. Si empeora, puede diseminarse a la órbita y los músculos del francine (celulitis orbitaria). La celulitis orbitaria es david emergencia médica.      ¿Cuáles son las causas?  La celulitis preseptal se produce a causa de bacterias. En casos poco frecuentes, puede ser causada por un virus o un hongo. Los gérmenes que causan la celulitis preseptal pueden provenir de:  •David infección que se contagia en los senos paranasales, cerca de los ojos.      •David lesión cerca del francine, kalpana un arañazo, la mordedura de un animal o la picadura de un insecto.      •David erupción cutánea que se infecta, kalpana eczema o hiedra venenosa.      •Un grano infectado en el párpado (orzuelo).      •David infección después de david cirugía de los párpados o david lesión.        ¿Qué incrementa el riesgo?  Es más probable que usted sufra esta afección si:  •Tiene debilitado el sistema encargado de combatir las enfermedades (sistema inmunitario).      •Tiene david afección que aumenta el riesgo de infecciones sinusales, kalpana pólipos nasales.        ¿Cuáles son los signos o los síntomas?  Por lo general, los síntomas de esta afección aparecen de forma repentina. Entre los síntomas se pueden incluir los siguientes:  •Párpados que se ponen rojos y se hinchan, y se sienten inusualmente calientes al tacto.      •Fiebre.      •Dificultad para abrir el francine.      •Dolor de britt.      •Dolor facial.        ¿Cómo se diagnostica?  Esta afección se puede diagnosticar en función de los síntomas, rubi antecedentes médicos y un examen del francine. Pueden hacerle estudios, por ejemplo:  •Análisis de dameon.      •Exploración por tomografía computarizada (TC).      •Resonancia magnética (RM).        ¿Cómo se trata?    Esta afección se trata con antibióticos. Estos pueden administrarse por boca (vía oral), por vía intravenosa (IV) o mediante david inyección. En casos poco frecuentes, puede necesitar cirugía para drenar la wanda infectada.      Siga estas indicaciones en lopez casa:    Medicamentos     •Si le recetaron un antibiótico para milton en casa, tómelo kalpana se lo haya indicado el médico. No deje de milton el antibiótico aunque comience a sentirse mejor.      •De [place] los medicamentos de venta vega y los recetados solamente kalpana se lo haya indicado el médico.      Cuidados en las afecciones del francine     • No use ninguna gota oftálmica sin obtener dayana la aprobación del médico.      • No se toque ni se frote el francine. Si usa lentes de contacto, no los use hasta que el médico lo autorice.      •Mantenga la wanda del francine limpia y seca.      •Lave la wanda del francine con un paño limpio, Tejon y champú para bebés o un jabón suave.      •Para ayudar a aliviar las molestias, coloque un paño limpio y húmedo con agua tibia sobre el francine. Deje el paño en el lugar edita algunos minutos, luego retírelo.      Instrucciones generales     •Lávese las debora frecuentemente con agua y jabón. Use desinfectante para debora si no dispone de agua y jabón.      • No consuma ningún producto que contenga nicotina o tabaco, kalpana cigarrillos y cigarrillos electrónicos. Si necesita ayuda para dejar de fumar, consulte al médico.      •Lara suficiente líquido kalpana para mantener la orina de color amarillo pálido.      •Pregúntele al médico si es seguro que usted conduzca.      •Mantenga las vacunas al día.      •Concurra a todas las visitas de control kalpana se lo haya indicado el médico. Estas incluyen las visitas al oculista (oftalmólogo) o al dentista. Ione es importante.        Solicite ayuda de inmediato si:    •Aparecen nuevos síntomas.      •Rubi síntomas empeoran o no mejoran con el tratamiento.      •Tiene fiebre.      •Lopez visión se vuelve borrosa o empeora de algún modo.      •Parece que el francine le sobresaliera de la diogenes (proptosis).      •Tiene problemas para  los ojos.      •Tiene un dolor de britt intenso.      •Presenta rigidez o dolor intenso en el waylon.        Resumen    •La celulitis preseptal es david infección en el párpado y en el tejido que rodea el francine.      •Los síntomas de la celulitis preseptal suelen aparecer de manera repentina e incluyen enrojecimiento e hinchazón de los párpados, fiebre, dificultad para abrir los ojos, dolor de britt y en el mary.      •Esta afección se trata con antibióticos. No deje de milton el antibiótico aunque comience a sentirse mejor.      Esta información no tiene kalpana fin reemplazar el consejo del médico. Asegúrese de hacerle al médico cualquier pregunta que tenga.

## 2021-03-20 NOTE — ED STATDOCS - PATIENT PORTAL LINK FT
You can access the FollowMyHealth Patient Portal offered by [facility] by registering at the following website: [email or web address]. By joining [employer]’s FollowMyHealth portal, you will also be able to view your health information using other applications (apps) compatible with our system.

## 2021-04-01 ENCOUNTER — OUTPATIENT (OUTPATIENT)
Dept: OUTPATIENT SERVICES | Facility: HOSPITAL | Age: 41
LOS: 1 days | End: 2021-04-01
Payer: COMMERCIAL

## 2021-04-01 DIAGNOSIS — Z00.8 ENCOUNTER FOR OTHER GENERAL EXAMINATION: ICD-10-CM

## 2021-04-01 DIAGNOSIS — Z98.51 TUBAL LIGATION STATUS: Chronic | ICD-10-CM

## 2021-04-01 DIAGNOSIS — Z45.09 ENCOUNTER FOR ADJUSTMENT AND MANAGEMENT OF OTHER CARDIAC DEVICE: Chronic | ICD-10-CM

## 2021-04-01 DIAGNOSIS — Z98.890 OTHER SPECIFIED POSTPROCEDURAL STATES: Chronic | ICD-10-CM

## 2021-04-02 ENCOUNTER — APPOINTMENT (OUTPATIENT)
Dept: MAMMOGRAPHY | Facility: CLINIC | Age: 41
End: 2021-04-02
Payer: COMMERCIAL

## 2021-04-02 ENCOUNTER — RESULT REVIEW (OUTPATIENT)
Age: 41
End: 2021-04-02

## 2021-04-02 DIAGNOSIS — R92.8 OTHER ABNORMAL AND INCONCLUSIVE FINDINGS ON DIAGNOSTIC IMAGING OF BREAST: ICD-10-CM

## 2021-04-02 PROCEDURE — G0279: CPT

## 2021-04-02 PROCEDURE — G0279: CPT | Mod: 26

## 2021-04-02 PROCEDURE — 77066 DX MAMMO INCL CAD BI: CPT | Mod: 26

## 2021-04-02 PROCEDURE — 77066 DX MAMMO INCL CAD BI: CPT

## 2021-04-08 ENCOUNTER — RESULT REVIEW (OUTPATIENT)
Age: 41
End: 2021-04-08

## 2021-04-08 ENCOUNTER — OUTPATIENT (OUTPATIENT)
Dept: OUTPATIENT SERVICES | Facility: HOSPITAL | Age: 41
LOS: 1 days | End: 2021-04-08
Payer: COMMERCIAL

## 2021-04-08 ENCOUNTER — APPOINTMENT (OUTPATIENT)
Dept: MAMMOGRAPHY | Facility: CLINIC | Age: 41
End: 2021-04-08
Payer: COMMERCIAL

## 2021-04-08 DIAGNOSIS — Z45.09 ENCOUNTER FOR ADJUSTMENT AND MANAGEMENT OF OTHER CARDIAC DEVICE: Chronic | ICD-10-CM

## 2021-04-08 DIAGNOSIS — Z00.8 ENCOUNTER FOR OTHER GENERAL EXAMINATION: ICD-10-CM

## 2021-04-08 DIAGNOSIS — Z98.890 OTHER SPECIFIED POSTPROCEDURAL STATES: Chronic | ICD-10-CM

## 2021-04-08 DIAGNOSIS — Z98.51 TUBAL LIGATION STATUS: Chronic | ICD-10-CM

## 2021-04-08 PROCEDURE — 77065 DX MAMMO INCL CAD UNI: CPT | Mod: 26,LT

## 2021-04-08 PROCEDURE — 77065 DX MAMMO INCL CAD UNI: CPT

## 2021-04-08 PROCEDURE — 19082 BX BREAST ADD LESION STRTCTC: CPT | Mod: LT

## 2021-04-08 PROCEDURE — 19082 BX BREAST ADD LESION STRTCTC: CPT

## 2021-04-08 PROCEDURE — 19081 BX BREAST 1ST LESION STRTCTC: CPT

## 2021-04-08 PROCEDURE — 19081 BX BREAST 1ST LESION STRTCTC: CPT | Mod: LT

## 2021-04-08 PROCEDURE — A4648: CPT

## 2021-04-08 PROCEDURE — 88305 TISSUE EXAM BY PATHOLOGIST: CPT

## 2021-04-08 PROCEDURE — 88305 TISSUE EXAM BY PATHOLOGIST: CPT | Mod: 26

## 2021-11-22 ENCOUNTER — APPOINTMENT (OUTPATIENT)
Dept: ULTRASOUND IMAGING | Facility: CLINIC | Age: 41
End: 2021-11-22

## 2021-11-30 ENCOUNTER — EMERGENCY (EMERGENCY)
Facility: HOSPITAL | Age: 41
LOS: 0 days | Discharge: HOME | End: 2021-11-30
Attending: EMERGENCY MEDICINE
Payer: COMMERCIAL

## 2021-11-30 VITALS
OXYGEN SATURATION: 100 % | RESPIRATION RATE: 18 BRPM | DIASTOLIC BLOOD PRESSURE: 70 MMHG | SYSTOLIC BLOOD PRESSURE: 115 MMHG | TEMPERATURE: 98 F | HEART RATE: 75 BPM

## 2021-11-30 VITALS
RESPIRATION RATE: 18 BRPM | SYSTOLIC BLOOD PRESSURE: 116 MMHG | OXYGEN SATURATION: 100 % | TEMPERATURE: 99 F | DIASTOLIC BLOOD PRESSURE: 77 MMHG | HEIGHT: 72 IN | HEART RATE: 78 BPM | WEIGHT: 139.99 LBS

## 2021-11-30 DIAGNOSIS — Z98.890 OTHER SPECIFIED POSTPROCEDURAL STATES: ICD-10-CM
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
## 2021-11-30 DIAGNOSIS — Z98.890 OTHER SPECIFIED POSTPROCEDURAL STATES: Chronic | ICD-10-CM

## 2021-11-30 DIAGNOSIS — M25.561 PAIN IN RIGHT KNEE: ICD-10-CM

## 2021-11-30 DIAGNOSIS — S80.01XA CONTUSION OF RIGHT KNEE, INITIAL ENCOUNTER: ICD-10-CM
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
## 2021-11-30 DIAGNOSIS — R73.03 PREDIABETES: ICD-10-CM

## 2021-11-30 DIAGNOSIS — Z98.51 TUBAL LIGATION STATUS: ICD-10-CM

## 2021-11-30 DIAGNOSIS — Z45.09 ENCOUNTER FOR ADJUSTMENT AND MANAGEMENT OF OTHER CARDIAC DEVICE: Chronic | ICD-10-CM

## 2021-11-30 DIAGNOSIS — Z98.51 TUBAL LIGATION STATUS: Chronic | ICD-10-CM

## 2021-11-30 PROCEDURE — 99283 EMERGENCY DEPT VISIT LOW MDM: CPT | Mod: 25

## 2021-11-30 PROCEDURE — 73562 X-RAY EXAM OF KNEE 3: CPT | Mod: RT

## 2021-11-30 PROCEDURE — 99283 EMERGENCY DEPT VISIT LOW MDM: CPT

## 2021-11-30 PROCEDURE — 73562 X-RAY EXAM OF KNEE 3: CPT | Mod: 26,RT

## 2021-11-30 RX ORDER — ACETAMINOPHEN 500 MG
975 TABLET ORAL ONCE
Refills: 0 | Status: COMPLETED | OUTPATIENT
Start: 2021-11-30 | End: 2021-11-30

## 2021-11-30 RX ADMIN — Medication 975 MILLIGRAM(S): at 16:15

## 2021-11-30 NOTE — ED PROVIDER NOTE - NSICDXPASTSURGICALHX_GEN_ALL_CORE_FT
PAST SURGICAL HISTORY:  Encounter for loop recorder at end of battery life     H/O tubal ligation     History of loop recorder
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
## 2021-11-30 NOTE — ED PROVIDER NOTE - PATIENT PORTAL LINK FT
You can access the FollowMyHealth Patient Portal offered by [facility] by registering at the following website: [email or web address]. By joining [employer]’s FollowMyHealth portal, you will also be able to view your health information using other applications (apps) compatible with our system.

## 2021-11-30 NOTE — ED ADULT NURSE NOTE - INTERVENTIONS DEFINITIONS
Call bell, personal items and telephone within reach/Non-slip footwear when patient is off stretcher/Physically safe environment: no spills, clutter or unnecessary equipment/Stretcher in lowest position, wheels locked, appropriate side rails in place/Provide visual cue, wrist band, yellow gown, etc./Monitor gait and stability

## 2021-12-21 ENCOUNTER — NON-APPOINTMENT (OUTPATIENT)
Age: 41
End: 2021-12-21

## 2021-12-21 ENCOUNTER — APPOINTMENT (OUTPATIENT)
Dept: NEUROLOGY | Facility: CLINIC | Age: 41
End: 2021-12-21
Payer: COMMERCIAL

## 2021-12-21 VITALS
DIASTOLIC BLOOD PRESSURE: 60 MMHG | HEIGHT: 61 IN | SYSTOLIC BLOOD PRESSURE: 100 MMHG | HEART RATE: 62 BPM | BODY MASS INDEX: 26.43 KG/M2 | TEMPERATURE: 98.3 F | WEIGHT: 140 LBS

## 2021-12-21 DIAGNOSIS — G43.009 MIGRAINE W/OUT AURA, NOT INTRACTABLE, W/OUT STATUS MIGRAINOSUS: ICD-10-CM

## 2021-12-21 PROCEDURE — 99203 OFFICE O/P NEW LOW 30 MIN: CPT

## 2021-12-21 RX ORDER — RIZATRIPTAN BENZOATE 10 MG/1
10 TABLET ORAL
Qty: 9 | Refills: 7 | Status: ACTIVE | COMMUNITY
Start: 2021-12-21 | End: 1900-01-01

## 2021-12-21 RX ORDER — FLUTICASONE PROPIONATE 50 UG/1
50 SPRAY, METERED NASAL
Qty: 16 | Refills: 0 | Status: ACTIVE | COMMUNITY
Start: 2021-09-24

## 2021-12-21 RX ORDER — ONDANSETRON 4 MG/1
4 TABLET, ORALLY DISINTEGRATING ORAL
Qty: 10 | Refills: 5 | Status: ACTIVE | COMMUNITY
Start: 2021-12-21 | End: 1900-01-01

## 2021-12-21 RX ORDER — CETIRIZINE HYDROCHLORIDE 10 MG/1
10 TABLET, COATED ORAL
Qty: 30 | Refills: 0 | Status: ACTIVE | COMMUNITY
Start: 2021-09-24

## 2021-12-21 NOTE — ASSESSMENT
[FreeTextEntry1] : 41-year-old woman history of migraine headaches, nonfocal exam. Reviewed and discussed her options, trigger management.\par We'll send a prescription for Maxalt 10 mg p.o. p.r.n. headache, can repeat in 2 hours. Maximum dose of 30 mg per day.\par Prescription for Zofran 4 mg sublingual p.r.n. nausea, and repeat in 6 hours.\par Advised to maintain a headache area.\par Return to office, 3-4 months

## 2021-12-21 NOTE — HISTORY OF PRESENT ILLNESS
[FreeTextEntry1] : 41 neural woman, right-handed complaining of intermittent headaches, described as severe, throbbing and pounding, immune once have both,usually sent around the eye, pressure and then goes up intensity, becomes throbbing pounding, associated with feeling of nausea and vertigo. It can last momentarily, sometimes 2. Helps her sleep,Excedrin for migraine, which helps the pain, but does not make the pain go away. The headaches and we'll convert her menstrual cycle, other triggers fatigue and stress are the most common.\par Denies any history of head injury, stroke, no transient loss of vision, trouble speaking, facial weakness or numbness of the face or extremities.\par The headaches usually worse during the summer months, and now during the colder seasons of the year,they are getting better. [Chronic Headache] : chronic headache [Aura] : no aura [Dizziness] : dizziness [Nausea] : nausea [Photophobia] : photophobia [Phonophobia] : phonophobia [Neck Pain] : neck pain [Scotoma] : no scotoma [Numbness] : no numbness [Tingling] : no tingling [Weakness] : no weakness [Scalp Tenderness] : scalp tenderness [___ Times Per Month] : [unfilled] times per month [> 4 hours] : > 4 hours [improved] : improved [Decreased] : Overall, patient's activity level has decreased [Improved] : The patient reports ~his/her~ symptoms since the last visit have improved

## 2021-12-21 NOTE — PHYSICAL EXAM
[General Appearance - Alert] : alert [General Appearance - In No Acute Distress] : in no acute distress [Oriented To Time, Place, And Person] : oriented to person, place, and time [Impaired Insight] : insight and judgment were intact [Affect] : the affect was normal [Person] : oriented to person [Place] : oriented to place [Time] : oriented to time [Concentration Intact] : normal concentrating ability [Visual Intact] : visual attention was ~T not ~L decreased [Naming Objects] : no difficulty naming common objects [Repeating Phrases] : no difficulty repeating a phrase [Writing A Sentence] : no difficulty writing a sentence [Fluency] : fluency intact [Comprehension] : comprehension intact [Reading] : reading intact [Past History] : adequate knowledge of personal past history [Cranial Nerves Optic (II)] : visual acuity intact bilaterally,  visual fields full to confrontation, pupils equal round and reactive to light [Cranial Nerves Oculomotor (III)] : extraocular motion intact [Cranial Nerves Trigeminal (V)] : facial sensation intact symmetrically [Cranial Nerves Facial (VII)] : face symmetrical [Cranial Nerves Vestibulocochlear (VIII)] : hearing was intact bilaterally [Cranial Nerves Glossopharyngeal (IX)] : tongue and palate midline [Cranial Nerves Accessory (XI - Cranial And Spinal)] : head turning and shoulder shrug symmetric [Cranial Nerves Hypoglossal (XII)] : there was no tongue deviation with protrusion [Motor Tone] : muscle tone was normal in all four extremities [Motor Strength] : muscle strength was normal in all four extremities [No Muscle Atrophy] : normal bulk in all four extremities [Sensation Tactile Decrease] : light touch was intact [Abnormal Walk] : normal gait [Balance] : balance was intact [Past-pointing] : there was no past-pointing [Tremor] : no tremor present [2+] : Ankle jerk left 2+ [Plantar Reflex Right Only] : normal on the right [Plantar Reflex Left Only] : normal on the left [Sclera] : the sclera and conjunctiva were normal [PERRL With Normal Accommodation] : pupils were equal in size, round, reactive to light, with normal accommodation [Extraocular Movements] : extraocular movements were intact [Full Visual Field] : full visual field [Outer Ear] : the ears and nose were normal in appearance [Both Tympanic Membranes Were Examined] : both tympanic membranes were normal [FreeTextEntry1] : no TMJ tenderness to palpation [Neck Appearance] : the appearance of the neck was normal [] : the neck was supple [Arterial Pulses Carotid] : carotid pulses were normal with no bruits [Full Pulse] : the pedal pulses are present [Edema] : there was no peripheral edema [No Visual Abnormalities] : no visible abnormalities [No Scoliosis] : no scoliosis [No Tenderness to Palpation] : no spine tenderness on palpation [No Masses] : no masses [Full ROM] : full ROM [No Pain with ROM] : no pain with motion in any direction
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
## 2022-03-09 NOTE — ED ADULT NURSE NOTE - HOW OFTEN DO YOU HAVE A DRINK CONTAINING ALCOHOL?
Patient: Loco Santana  YOB: 1960  Date: 3/9/22        Subjective:     Loco Santana is a 59 y.o. female who complains today of:    Chief Complaint   Patient presents with    Back Pain         Allergies:  Patient has no known allergies. No past medical history on file. No past surgical history on file. Family History   Problem Relation Age of Onset    Breast Cancer Mother      Social History     Socioeconomic History    Marital status:      Spouse name: Not on file    Number of children: Not on file    Years of education: Not on file    Highest education level: Not on file   Occupational History    Not on file   Tobacco Use    Smoking status: Former Smoker     Packs/day: 1.00     Years: 41.00     Pack years: 41.00     Types: Cigarettes     Start date: 80     Quit date: 3/15/2019     Years since quittin.9    Smokeless tobacco: Never Used   Substance and Sexual Activity    Alcohol use: Not Currently    Drug use: Never    Sexual activity: Yes     Partners: Male   Other Topics Concern    Not on file   Social History Narrative    Not on file     Social Determinants of Health     Financial Resource Strain:     Difficulty of Paying Living Expenses: Not on file   Food Insecurity:     Worried About Running Out of Food in the Last Year: Not on file    Melanie of Food in the Last Year: Not on file   Transportation Needs:     Lack of Transportation (Medical): Not on file    Lack of Transportation (Non-Medical):  Not on file   Physical Activity:     Days of Exercise per Week: Not on file    Minutes of Exercise per Session: Not on file   Stress:     Feeling of Stress : Not on file   Social Connections:     Frequency of Communication with Friends and Family: Not on file    Frequency of Social Gatherings with Friends and Family: Not on file    Attends Anglican Services: Not on file    Active Member of Clubs or Organizations: Not on file    Attends [employer] Never or Organization Meetings: Not on file    Marital Status: Not on file   Intimate Partner Violence:     Fear of Current or Ex-Partner: Not on file    Emotionally Abused: Not on file    Physically Abused: Not on file    Sexually Abused: Not on file   Housing Stability:     Unable to Pay for Housing in the Last Year: Not on file    Number of Debbiemocarlos in the Last Year: Not on file    Unstable Housing in the Last Year: Not on file       Current Outpatient Medications on File Prior to Visit   Medication Sig Dispense Refill    levothyroxine (SYNTHROID) 50 MCG tablet Take 1 tablet by mouth daily 90 tablet 3    terbinafine (LAMISIL) 250 MG tablet       Naproxen Sodium 220 MG CAPS Take 220 mg by mouth      Handicap Placard MISC by Does not apply route 1 each 0     No current facility-administered medications on file prior to visit. Pt presents today for a f/u of her  chronic low back pain. Pain 7/10. SI injections on 8/3/21 did not help much. She is complaining of bilateral knee pain as well as hip pain. States she previously had injections in the hip for bursitis which worked well. Knee x-ray from May 2019 left mild medial arthritis right large osteophytes surgical changes of the left femoral condyle advanced degenerative changes of the right knee mild degenerative changes of left knee. Previous lumbar MBB helped significantly in the past but is not interested in this \"that scared me\". She says past eliseo with Dr. Westley Campos helped but isn't interested in having any more injection. . Had MRI of lumbar spine showing degenerative changes and foraminal stenosis at L3-4 per report. She has a history of fusion at L4-5 with Dr. Santiago Awad in 2011. 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 She no longer can work she says d/t the pain. Former smoker. XR of low back show the L4-5 fusion.  Uses Aleve.     Takes Tramadol 50mg 2 tabs TID PRN pain.    Pt feels that standing, doing dishes/cooking Decreased range of motion. Tenderness present. Lymphadenopathy:      Cervical: No cervical adenopathy. Skin:     General: Skin is warm and dry. Findings: No erythema or rash. Neurological:      Mental Status: She is alert and oriented to person, place, and time. Cranial Nerves: No cranial nerve deficit. Motor: No weakness. Gait: Gait abnormal.      Deep Tendon Reflexes: Reflexes are normal and symmetric. Reflexes normal.   Psychiatric:         Mood and Affect: Mood normal.         Behavior: Behavior normal.         Thought Content: Thought content normal.         Judgment: Judgment normal.            Assessment:        Diagnosis Orders   1. Osteoarthritis of both knees, unspecified osteoarthritis type  XR KNEE LEFT (3 VIEWS)    XR KNEE RIGHT (3 VIEWS)    [facility] - [place]    AR ARTHROCENTESIS ASPIR&/INJ MAJOR JT/BURSA W/US    CHG FLUOROSCOPIC GUIDANCE NEEDLE PLACEMENT ADD ON   2. Lumbosacral spondylosis without myelopathy  traMADol (ULTRAM) 50 MG tablet    [facility] - [place]   3. History of lumbar fusion  traMADol (ULTRAM) 50 MG tablet   4. Chronic pain syndrome  traMADol (ULTRAM) 50 MG tablet   5. Left hip pain  XR HIP LEFT (2-3 VIEWS)    [facility] - [place]   6. Right hip pain  XR HIP RIGHT (2-3 VIEWS)       Plan:     Periodic Controlled Substance Monitoring: Possible medication side effects, risk of tolerance/dependence & alternative treatments discussed. ,No signs of potential drug abuse or diversion identified. ,Assessed functional status. Star Khalil, APRN - CNP)    Orders Placed This Encounter   Medications    traMADol (ULTRAM) 50 MG tablet     Sig: Take 2 tablets by mouth 3 times daily as needed for Pain for up to 30 days.  Fill date 3/1122     Dispense:  180 tablet     Refill:  0     Reduce doses taken as pain becomes manageable       Orders Placed This Encounter   Procedures    XR KNEE LEFT (3 VIEWS)     Standing Status:   Future     Standing Expiration Date:   6/7/2022     Order Specific Question:   Reason for exam:     Answer:   eval oa    XR KNEE RIGHT (3 VIEWS)     Standing Status:   Future     Standing Expiration Date:   6/7/2022    XR HIP LEFT (2-3 VIEWS)     Standing Status:   Future     Standing Expiration Date:   6/7/2022     Order Specific Question:   Reason for exam:     Answer:   eval for oa    XR HIP RIGHT (2-3 VIEWS)     Standing Status:   Future     Standing Expiration Date:   6/7/2022     Order Specific Question:   Reason for exam:     Answer:   eval for [address] Physical Therapy - [place]     Referral Priority:   Routine     Referral Type:   Eval and Treat     Referral Reason:   Specialty Services Required     Requested Specialty:   Physical Therapy     Number of Visits Requested:   1    CHG FLUOROSCOPIC GUIDANCE NEEDLE PLACEMENT ADD ON     Standing Status:   Future     Standing Expiration Date:   6/7/2022    HI ARTHROCENTESIS ASPIR&/INJ MAJOR JT/BURSA W/US     Bilateral knee steroid injections     Standing Status:   Future     Standing Expiration Date:   6/7/2022     -xray knees to eval for OA  -Steroid injections to both knees for pain and comfort  -We will order Physical Therapy Eval and Treat for reducing spasm, soft tissue work, massage, stretching and strengthening. Aqua therapy. Knees hip and lumbar spine    Discussed options with the patient today. Anatomic model pathology was shown and reviewed with pt. All questions were answered. Relevant imaging reviewed, NDP reviewed and pain generators reviewed. Pt verbalized understanding and agrees with above plan. Will continue medications as they do help pt function with ADL and improve quality of life. Pt understands potential side effects from opioids such as constipation, dry mouth, dizziness, opioid use disorder, and overdose.  Pt has failed non opioid therapy and decision was made to prescribe opioids to help with daily function and improve quality of life. Discussed the principles of opioid tolerance as well as the concerns regarding opioid diversion, misuse, and abuse. Discussed the risks of respiratory depression and death while on pain medications, especially when combined with other sedative substances. Discussed the elevated risks of excessive sedation while on pain medications. Advised him against driving or operating heavy machinery or performing any activities where he may harm himself or others while on pain medications. Particular caution was emphasized especially during dose adjustments and medication changes. [place]ARRS was reviewed. UTOX from June 2021 appropriate; ORT score = 0  Daily MME 30  Narcan prescribed  and understands the proper use in the event of an overdose. This NP saw pt under direct supervision of Dr Dona Ordaz. Controlled Substances Monitoring: Periodic Controlled Substance Monitoring: Possible medication side effects, risk of tolerance/dependence & alternative treatments discussed. ,No signs of potential drug abuse or diversion identified. ,Assessed functional status. Ananda Roberts, APRN - CNP)        Follow up:  Return in about 4 weeks (around 4/6/2022) for f/u after procedure and reassess pain/medications.     Sander Pantoja, APRN - CNP

## 2022-05-02 NOTE — ED ADULT NURSE NOTE - HOW OFTEN DO YOU HAVE A DRINK CONTAINING ALCOHOL?
Never W[place]pe[place] Pregnancy And Lactation Text: This medication is Pregnancy Category X and contraindicated in pregnancy and in women who may become pregnant. It is unknown if this medication is excreted in breast milk.

## 2022-08-30 RX ORDER — ERGOCALCIFEROL 1.25 MG/1
0 CAPSULE ORAL
Qty: 0 | Refills: 0 | DISCHARGE

## 2022-09-02 ENCOUNTER — APPOINTMENT (OUTPATIENT)
Dept: MAMMOGRAPHY | Facility: CLINIC | Age: 42
End: 2022-09-02

## 2022-09-02 ENCOUNTER — APPOINTMENT (OUTPATIENT)
Dept: ULTRASOUND IMAGING | Facility: CLINIC | Age: 42
End: 2022-09-02

## 2022-09-02 ENCOUNTER — OUTPATIENT (OUTPATIENT)
Dept: OUTPATIENT SERVICES | Facility: HOSPITAL | Age: 42
LOS: 1 days | End: 2022-09-02
Payer: MEDICAID

## 2022-09-02 DIAGNOSIS — Z45.09 ENCOUNTER FOR ADJUSTMENT AND MANAGEMENT OF OTHER CARDIAC DEVICE: Chronic | ICD-10-CM

## 2022-09-02 DIAGNOSIS — Z98.890 OTHER SPECIFIED POSTPROCEDURAL STATES: Chronic | ICD-10-CM

## 2022-09-02 DIAGNOSIS — Z12.31 ENCOUNTER FOR SCREENING MAMMOGRAM FOR MALIGNANT NEOPLASM OF BREAST: ICD-10-CM

## 2022-09-02 DIAGNOSIS — Z98.51 TUBAL LIGATION STATUS: Chronic | ICD-10-CM

## 2022-09-02 PROBLEM — R55 SYNCOPE AND COLLAPSE: Chronic | Status: ACTIVE | Noted: 2019-04-18

## 2022-09-02 PROBLEM — R73.03 PREDIABETES: Chronic | Status: ACTIVE | Noted: 2019-03-29

## 2022-09-02 PROCEDURE — 77063 BREAST TOMOSYNTHESIS BI: CPT

## 2022-09-02 PROCEDURE — 77067 SCR MAMMO BI INCL CAD: CPT

## 2022-09-02 PROCEDURE — 76641 ULTRASOUND BREAST COMPLETE: CPT | Mod: 26,50

## 2022-09-02 PROCEDURE — 77063 BREAST TOMOSYNTHESIS BI: CPT | Mod: 26

## 2022-09-02 PROCEDURE — 76641 ULTRASOUND BREAST COMPLETE: CPT

## 2022-09-02 PROCEDURE — 77067 SCR MAMMO BI INCL CAD: CPT | Mod: 26

## 2022-09-22 ENCOUNTER — RESULT REVIEW (OUTPATIENT)
Age: 42
End: 2022-09-22

## 2022-09-24 ENCOUNTER — OUTPATIENT (OUTPATIENT)
Dept: OUTPATIENT SERVICES | Facility: HOSPITAL | Age: 42
LOS: 1 days | End: 2022-09-24
Payer: MEDICAID

## 2022-09-24 ENCOUNTER — APPOINTMENT (OUTPATIENT)
Dept: ULTRASOUND IMAGING | Facility: CLINIC | Age: 42
End: 2022-09-24

## 2022-09-24 DIAGNOSIS — R10.2 PELVIC AND PERINEAL PAIN: ICD-10-CM

## 2022-09-24 DIAGNOSIS — Z98.51 TUBAL LIGATION STATUS: Chronic | ICD-10-CM

## 2022-09-24 DIAGNOSIS — Z98.890 OTHER SPECIFIED POSTPROCEDURAL STATES: Chronic | ICD-10-CM

## 2022-09-24 DIAGNOSIS — Z45.09 ENCOUNTER FOR ADJUSTMENT AND MANAGEMENT OF OTHER CARDIAC DEVICE: Chronic | ICD-10-CM

## 2022-09-24 PROCEDURE — 76856 US EXAM PELVIC COMPLETE: CPT

## 2022-09-24 PROCEDURE — 76856 US EXAM PELVIC COMPLETE: CPT | Mod: 26

## 2022-09-24 PROCEDURE — 76830 TRANSVAGINAL US NON-OB: CPT | Mod: 26

## 2022-09-24 PROCEDURE — 76830 TRANSVAGINAL US NON-OB: CPT

## 2022-11-09 ENCOUNTER — EMERGENCY (EMERGENCY)
Facility: HOSPITAL | Age: 42
LOS: 0 days | Discharge: HOME | End: 2022-11-09
Attending: EMERGENCY MEDICINE
Payer: MEDICAID

## 2022-11-09 VITALS
OXYGEN SATURATION: 100 % | SYSTOLIC BLOOD PRESSURE: 113 MMHG | DIASTOLIC BLOOD PRESSURE: 75 MMHG | RESPIRATION RATE: 20 BRPM | HEART RATE: 87 BPM | TEMPERATURE: 99 F

## 2022-11-09 VITALS — WEIGHT: 139.99 LBS

## 2022-11-09 DIAGNOSIS — R51.9 HEADACHE, UNSPECIFIED: ICD-10-CM

## 2022-11-09 DIAGNOSIS — Z98.890 OTHER SPECIFIED POSTPROCEDURAL STATES: Chronic | ICD-10-CM

## 2022-11-09 DIAGNOSIS — U07.1 COVID-19: ICD-10-CM

## 2022-11-09 DIAGNOSIS — Z45.09 ENCOUNTER FOR ADJUSTMENT AND MANAGEMENT OF OTHER CARDIAC DEVICE: Chronic | ICD-10-CM

## 2022-11-09 DIAGNOSIS — R05.9 COUGH, UNSPECIFIED: ICD-10-CM

## 2022-11-09 DIAGNOSIS — R73.03 PREDIABETES: ICD-10-CM

## 2022-11-09 DIAGNOSIS — Z98.51 TUBAL LIGATION STATUS: Chronic | ICD-10-CM

## 2022-11-09 DIAGNOSIS — R68.83 CHILLS (WITHOUT FEVER): ICD-10-CM

## 2022-11-09 LAB
FLUAV AG NPH QL: SIGNIFICANT CHANGE UP
FLUBV AG NPH QL: SIGNIFICANT CHANGE UP
RSV RNA NPH QL NAA+NON-PROBE: SIGNIFICANT CHANGE UP
SARS-COV-2 RNA SPEC QL NAA+PROBE: DETECTED

## 2022-11-09 PROCEDURE — 99284 EMERGENCY DEPT VISIT MOD MDM: CPT

## 2022-11-09 PROCEDURE — 99283 EMERGENCY DEPT VISIT LOW MDM: CPT

## 2022-11-09 PROCEDURE — 0241U: CPT

## 2022-11-09 NOTE — ED STATDOCS - PATIENT PORTAL LINK FT
You can access the FollowMyHealth Patient Portal offered by [facility] by registering at the following website: [email or web address]. By joining [employer]’s FollowMyHealth portal, you will also be able to view your health information using other applications (apps) compatible with our system.

## 2022-11-09 NOTE — ED STATDOCS - PHYSICAL EXAMINATION
Physical Exam:  Gen: NAD, non-toxic appearing, able to ambulate without assistance  Head: NCAT  HEENT: EOMI, PEERLA, normal conjunctiva, tongue midline, oral mucosa moist  Lung: CTAB, no respiratory distress, no wheezes/rhonchi/rales B/L, speaking in full sentences. +dry cough.  CV: RRR, no murmurs, rubs or gallops, distal pulses 2+ b/l  Abd: soft, nontender, no distention, no guarding, no rigidity, no rebound tenderness  MSK: no visible deformities, ROM normal in UE/LE  Skin: Warm, well perfused, no rash  Psych: normal affect, calm

## 2022-11-09 NOTE — ED STATDOCS - OBJECTIVE STATEMENT
41 y/o female PMHx of pre-diabetes and syncope presents to the ED with  daughter c/o cough, body aches, chills, and headache. Pt reports she wants a COVID test. No sick contacts. Pt is vaccinated for COVID-19.

## 2023-05-06 ENCOUNTER — APPOINTMENT (OUTPATIENT)
Dept: ULTRASOUND IMAGING | Facility: CLINIC | Age: 43
End: 2023-05-06
Payer: MEDICAID

## 2023-05-06 ENCOUNTER — OUTPATIENT (OUTPATIENT)
Dept: OUTPATIENT SERVICES | Facility: HOSPITAL | Age: 43
LOS: 1 days | End: 2023-05-06
Payer: MEDICAID

## 2023-05-06 DIAGNOSIS — Z45.09 ENCOUNTER FOR ADJUSTMENT AND MANAGEMENT OF OTHER CARDIAC DEVICE: Chronic | ICD-10-CM

## 2023-05-06 DIAGNOSIS — Z00.8 ENCOUNTER FOR OTHER GENERAL EXAMINATION: ICD-10-CM

## 2023-05-06 DIAGNOSIS — Z98.890 OTHER SPECIFIED POSTPROCEDURAL STATES: Chronic | ICD-10-CM

## 2023-05-06 DIAGNOSIS — R10.2 PELVIC AND PERINEAL PAIN: ICD-10-CM

## 2023-05-06 DIAGNOSIS — Z98.51 TUBAL LIGATION STATUS: Chronic | ICD-10-CM

## 2023-05-06 PROCEDURE — 76830 TRANSVAGINAL US NON-OB: CPT | Mod: 26

## 2023-05-06 PROCEDURE — 76856 US EXAM PELVIC COMPLETE: CPT

## 2023-05-06 PROCEDURE — 76856 US EXAM PELVIC COMPLETE: CPT | Mod: 26

## 2023-05-06 PROCEDURE — 76830 TRANSVAGINAL US NON-OB: CPT

## 2023-05-22 ENCOUNTER — NON-APPOINTMENT (OUTPATIENT)
Age: 43
End: 2023-05-22

## 2023-06-12 ENCOUNTER — EMERGENCY (EMERGENCY)
Facility: HOSPITAL | Age: 43
LOS: 0 days | Discharge: HOME | End: 2023-06-12
Attending: EMERGENCY MEDICINE
Payer: MEDICAID

## 2023-06-12 VITALS
HEART RATE: 70 BPM | TEMPERATURE: 98 F | SYSTOLIC BLOOD PRESSURE: 119 MMHG | DIASTOLIC BLOOD PRESSURE: 78 MMHG | RESPIRATION RATE: 16 BRPM | OXYGEN SATURATION: 100 %

## 2023-06-12 VITALS
DIASTOLIC BLOOD PRESSURE: 78 MMHG | OXYGEN SATURATION: 100 % | SYSTOLIC BLOOD PRESSURE: 108 MMHG | HEART RATE: 65 BPM | RESPIRATION RATE: 18 BRPM | TEMPERATURE: 98 F

## 2023-06-12 DIAGNOSIS — R51.9 HEADACHE, UNSPECIFIED: ICD-10-CM

## 2023-06-12 DIAGNOSIS — R42 DIZZINESS AND GIDDINESS: ICD-10-CM

## 2023-06-12 DIAGNOSIS — M25.511 PAIN IN RIGHT SHOULDER: ICD-10-CM

## 2023-06-12 DIAGNOSIS — R11.0 NAUSEA: ICD-10-CM

## 2023-06-12 DIAGNOSIS — R55 SYNCOPE AND COLLAPSE: ICD-10-CM

## 2023-06-12 DIAGNOSIS — Z98.51 TUBAL LIGATION STATUS: ICD-10-CM

## 2023-06-12 DIAGNOSIS — Z45.09 ENCOUNTER FOR ADJUSTMENT AND MANAGEMENT OF OTHER CARDIAC DEVICE: Chronic | ICD-10-CM

## 2023-06-12 DIAGNOSIS — Z98.51 TUBAL LIGATION STATUS: Chronic | ICD-10-CM

## 2023-06-12 DIAGNOSIS — N93.9 ABNORMAL UTERINE AND VAGINAL BLEEDING, UNSPECIFIED: ICD-10-CM

## 2023-06-12 DIAGNOSIS — R07.89 OTHER CHEST PAIN: ICD-10-CM

## 2023-06-12 DIAGNOSIS — Z98.890 OTHER SPECIFIED POSTPROCEDURAL STATES: Chronic | ICD-10-CM

## 2023-06-12 LAB
ALBUMIN SERPL ELPH-MCNC: 3.8 G/DL — SIGNIFICANT CHANGE UP (ref 3.3–5)
ALP SERPL-CCNC: 85 U/L — SIGNIFICANT CHANGE UP (ref 40–120)
ALT FLD-CCNC: 23 U/L — SIGNIFICANT CHANGE UP (ref 12–78)
ANION GAP SERPL CALC-SCNC: 3 MMOL/L — LOW (ref 5–17)
AST SERPL-CCNC: 17 U/L — SIGNIFICANT CHANGE UP (ref 15–37)
BASOPHILS # BLD AUTO: 0.06 K/UL — SIGNIFICANT CHANGE UP (ref 0–0.2)
BASOPHILS NFR BLD AUTO: 0.6 % — SIGNIFICANT CHANGE UP (ref 0–2)
BILIRUB SERPL-MCNC: 0.3 MG/DL — SIGNIFICANT CHANGE UP (ref 0.2–1.2)
BUN SERPL-MCNC: 9 MG/DL — SIGNIFICANT CHANGE UP (ref 7–23)
CALCIUM SERPL-MCNC: 9 MG/DL — SIGNIFICANT CHANGE UP (ref 8.5–10.1)
CHLORIDE SERPL-SCNC: 108 MMOL/L — SIGNIFICANT CHANGE UP (ref 96–108)
CO2 SERPL-SCNC: 29 MMOL/L — SIGNIFICANT CHANGE UP (ref 22–31)
CREAT SERPL-MCNC: 0.8 MG/DL — SIGNIFICANT CHANGE UP (ref 0.5–1.3)
EGFR: 93 ML/MIN/1.73M2 — SIGNIFICANT CHANGE UP
EOSINOPHIL # BLD AUTO: 0.08 K/UL — SIGNIFICANT CHANGE UP (ref 0–0.5)
EOSINOPHIL NFR BLD AUTO: 0.8 % — SIGNIFICANT CHANGE UP (ref 0–6)
GLUCOSE SERPL-MCNC: 106 MG/DL — HIGH (ref 70–99)
HCG SERPL-ACNC: <1 MIU/ML — SIGNIFICANT CHANGE UP
HCT VFR BLD CALC: 39.6 % — SIGNIFICANT CHANGE UP (ref 34.5–45)
HGB BLD-MCNC: 13.6 G/DL — SIGNIFICANT CHANGE UP (ref 11.5–15.5)
IMM GRANULOCYTES NFR BLD AUTO: 0.2 % — SIGNIFICANT CHANGE UP (ref 0–0.9)
LYMPHOCYTES # BLD AUTO: 2.28 K/UL — SIGNIFICANT CHANGE UP (ref 1–3.3)
LYMPHOCYTES # BLD AUTO: 23.9 % — SIGNIFICANT CHANGE UP (ref 13–44)
MCHC RBC-ENTMCNC: 30.8 PG — SIGNIFICANT CHANGE UP (ref 27–34)
MCHC RBC-ENTMCNC: 34.3 GM/DL — SIGNIFICANT CHANGE UP (ref 32–36)
MCV RBC AUTO: 89.8 FL — SIGNIFICANT CHANGE UP (ref 80–100)
MONOCYTES # BLD AUTO: 0.61 K/UL — SIGNIFICANT CHANGE UP (ref 0–0.9)
MONOCYTES NFR BLD AUTO: 6.4 % — SIGNIFICANT CHANGE UP (ref 2–14)
NEUTROPHILS # BLD AUTO: 6.49 K/UL — SIGNIFICANT CHANGE UP (ref 1.8–7.4)
NEUTROPHILS NFR BLD AUTO: 68.1 % — SIGNIFICANT CHANGE UP (ref 43–77)
PLATELET # BLD AUTO: 368 K/UL — SIGNIFICANT CHANGE UP (ref 150–400)
POTASSIUM SERPL-MCNC: 3.9 MMOL/L — SIGNIFICANT CHANGE UP (ref 3.5–5.3)
POTASSIUM SERPL-SCNC: 3.9 MMOL/L — SIGNIFICANT CHANGE UP (ref 3.5–5.3)
PROT SERPL-MCNC: 7.9 GM/DL — SIGNIFICANT CHANGE UP (ref 6–8.3)
RBC # BLD: 4.41 M/UL — SIGNIFICANT CHANGE UP (ref 3.8–5.2)
RBC # FLD: 12.4 % — SIGNIFICANT CHANGE UP (ref 10.3–14.5)
SODIUM SERPL-SCNC: 140 MMOL/L — SIGNIFICANT CHANGE UP (ref 135–145)
TROPONIN I, HIGH SENSITIVITY RESULT: 14.99 NG/L — SIGNIFICANT CHANGE UP
WBC # BLD: 9.54 K/UL — SIGNIFICANT CHANGE UP (ref 3.8–10.5)
WBC # FLD AUTO: 9.54 K/UL — SIGNIFICANT CHANGE UP (ref 3.8–10.5)

## 2023-06-12 PROCEDURE — 93005 ELECTROCARDIOGRAM TRACING: CPT

## 2023-06-12 PROCEDURE — 70450 CT HEAD/BRAIN W/O DYE: CPT | Mod: 26,MA

## 2023-06-12 PROCEDURE — 71045 X-RAY EXAM CHEST 1 VIEW: CPT

## 2023-06-12 PROCEDURE — 93010 ELECTROCARDIOGRAM REPORT: CPT

## 2023-06-12 PROCEDURE — 99285 EMERGENCY DEPT VISIT HI MDM: CPT | Mod: 25

## 2023-06-12 PROCEDURE — 76830 TRANSVAGINAL US NON-OB: CPT

## 2023-06-12 PROCEDURE — 80053 COMPREHEN METABOLIC PANEL: CPT

## 2023-06-12 PROCEDURE — 99284 EMERGENCY DEPT VISIT MOD MDM: CPT

## 2023-06-12 PROCEDURE — 85025 COMPLETE CBC W/AUTO DIFF WBC: CPT

## 2023-06-12 PROCEDURE — 84702 CHORIONIC GONADOTROPIN TEST: CPT

## 2023-06-12 PROCEDURE — 70450 CT HEAD/BRAIN W/O DYE: CPT | Mod: MA

## 2023-06-12 PROCEDURE — 76830 TRANSVAGINAL US NON-OB: CPT | Mod: 26

## 2023-06-12 PROCEDURE — 84484 ASSAY OF TROPONIN QUANT: CPT

## 2023-06-12 PROCEDURE — 71045 X-RAY EXAM CHEST 1 VIEW: CPT | Mod: 26

## 2023-06-12 PROCEDURE — 36415 COLL VENOUS BLD VENIPUNCTURE: CPT

## 2023-06-12 RX ORDER — ACETAMINOPHEN 500 MG
650 TABLET ORAL ONCE
Refills: 0 | Status: COMPLETED | OUTPATIENT
Start: 2023-06-12 | End: 2023-06-12

## 2023-06-12 RX ORDER — SODIUM CHLORIDE 9 MG/ML
1000 INJECTION INTRAMUSCULAR; INTRAVENOUS; SUBCUTANEOUS ONCE
Refills: 0 | Status: COMPLETED | OUTPATIENT
Start: 2023-06-12 | End: 2023-06-12

## 2023-06-12 RX ADMIN — Medication 650 MILLIGRAM(S): at 20:36

## 2023-06-12 RX ADMIN — SODIUM CHLORIDE 1000 MILLILITER(S): 9 INJECTION INTRAMUSCULAR; INTRAVENOUS; SUBCUTANEOUS at 16:45

## 2023-06-12 NOTE — ED PROVIDER NOTE - PATIENT PORTAL LINK FT
You can access the FollowMyHealth Patient Portal offered by [facility] by registering at the following website: [email or web address]. By joining [employer]’s FollowMyHealth portal, you will also be able to view your health information using other applications (apps) compatible with our system.

## 2023-06-12 NOTE — ED ADULT TRIAGE NOTE - CHIEF COMPLAINT QUOTE
Pt BIBEMS s/p syncopal episode 1 hour PTA.  Pt has hx of syncope, last time was a year ago.  Pt was working in [employer] when she suddenly syncopized.  Witnessed by daughter, -headstrike.  - blood thinners, no medication use.  No PMH.  Pt denies CP, SOB. As per EMT/daughter, pt was down for several minutes.  - allergies

## 2023-06-12 NOTE — ED PROVIDER NOTE - CLINICAL SUMMARY MEDICAL DECISION MAKING FREE TEXT BOX
Syncope workup with labs WNL, CXR WNL, CT head to be performed 2/2 mild headache on reexam, pelvic US due to breakthrough bleeding WNL. Likely discharge home with PCP follow up. William Patel D.O.

## 2023-06-12 NOTE — ED PROVIDER NOTE - PROGRESS NOTE DETAILS
Labs and US, CXR all WNL.  Reevaluation of patient, feeling somewhat improved, however now has mild headache. Given syncope, will obtain CT head. As long as negative, should be able to be D/C home in good condition with follow up. Willaim Patel D.O. Received report from Dr. Patel. Pt may be d/cd if ct head neg. CT head with no acute findings. Will d/c. Gregor AVILEZ

## 2023-06-12 NOTE — ED ADULT NURSE NOTE - NSFALLRISKINTERV_ED_ALL_ED

## 2023-06-12 NOTE — ED ADULT NURSE NOTE - OBJECTIVE STATEMENT
Pt is A&O x 3, states she passed out at work for 5 minutes. 20 G IV inserted into left A/C. Cardiac monitor in place.

## 2023-06-12 NOTE — ED PROVIDER NOTE - OBJECTIVE STATEMENT
43 y/o female with PMHx of syncope, prediabetes presents to the ED with CC of syncopal episode today, reporting dizziness, nausea, and some pain in her right chest and shoulder. Patient's LMP was 2 weeks ago and she restarted bleeding recently.

## 2023-07-04 ENCOUNTER — EMERGENCY (EMERGENCY)
Facility: HOSPITAL | Age: 43
LOS: 0 days | Discharge: HOME | End: 2023-07-04
Attending: EMERGENCY MEDICINE
Payer: MEDICAID

## 2023-07-04 VITALS — HEIGHT: 60 IN | WEIGHT: 139.99 LBS

## 2023-07-04 VITALS
DIASTOLIC BLOOD PRESSURE: 75 MMHG | TEMPERATURE: 99 F | SYSTOLIC BLOOD PRESSURE: 112 MMHG | RESPIRATION RATE: 16 BRPM | OXYGEN SATURATION: 99 % | HEART RATE: 70 BPM

## 2023-07-04 DIAGNOSIS — Z98.51 TUBAL LIGATION STATUS: Chronic | ICD-10-CM

## 2023-07-04 DIAGNOSIS — Z45.09 ENCOUNTER FOR ADJUSTMENT AND MANAGEMENT OF OTHER CARDIAC DEVICE: Chronic | ICD-10-CM

## 2023-07-04 DIAGNOSIS — R10.2 PELVIC AND PERINEAL PAIN: ICD-10-CM

## 2023-07-04 DIAGNOSIS — Z98.890 OTHER SPECIFIED POSTPROCEDURAL STATES: Chronic | ICD-10-CM

## 2023-07-04 DIAGNOSIS — R53.1 WEAKNESS: ICD-10-CM

## 2023-07-04 DIAGNOSIS — R42 DIZZINESS AND GIDDINESS: ICD-10-CM

## 2023-07-04 DIAGNOSIS — N92.0 EXCESSIVE AND FREQUENT MENSTRUATION WITH REGULAR CYCLE: ICD-10-CM

## 2023-07-04 DIAGNOSIS — R73.03 PREDIABETES: ICD-10-CM

## 2023-07-04 LAB
ABO RH CONFIRMATION: SIGNIFICANT CHANGE UP
ALBUMIN SERPL ELPH-MCNC: 3.3 G/DL — SIGNIFICANT CHANGE UP (ref 3.3–5)
ALP SERPL-CCNC: 71 U/L — SIGNIFICANT CHANGE UP (ref 40–120)
ALT FLD-CCNC: 18 U/L — SIGNIFICANT CHANGE UP (ref 12–78)
ANION GAP SERPL CALC-SCNC: 3 MMOL/L — LOW (ref 5–17)
APTT BLD: 27.8 SEC — SIGNIFICANT CHANGE UP (ref 27.5–35.5)
AST SERPL-CCNC: 17 U/L — SIGNIFICANT CHANGE UP (ref 15–37)
BASOPHILS # BLD AUTO: 0.08 K/UL — SIGNIFICANT CHANGE UP (ref 0–0.2)
BASOPHILS NFR BLD AUTO: 0.9 % — SIGNIFICANT CHANGE UP (ref 0–2)
BILIRUB SERPL-MCNC: 0.3 MG/DL — SIGNIFICANT CHANGE UP (ref 0.2–1.2)
BLD GP AB SCN SERPL QL: SIGNIFICANT CHANGE UP
BUN SERPL-MCNC: 12 MG/DL — SIGNIFICANT CHANGE UP (ref 7–23)
CALCIUM SERPL-MCNC: 8.6 MG/DL — SIGNIFICANT CHANGE UP (ref 8.5–10.1)
CHLORIDE SERPL-SCNC: 110 MMOL/L — HIGH (ref 96–108)
CO2 SERPL-SCNC: 28 MMOL/L — SIGNIFICANT CHANGE UP (ref 22–31)
CREAT SERPL-MCNC: 0.95 MG/DL — SIGNIFICANT CHANGE UP (ref 0.5–1.3)
EGFR: 76 ML/MIN/1.73M2 — SIGNIFICANT CHANGE UP
EOSINOPHIL # BLD AUTO: 0.15 K/UL — SIGNIFICANT CHANGE UP (ref 0–0.5)
EOSINOPHIL NFR BLD AUTO: 1.7 % — SIGNIFICANT CHANGE UP (ref 0–6)
GLUCOSE SERPL-MCNC: 101 MG/DL — HIGH (ref 70–99)
HCG SERPL-ACNC: <1 MIU/ML — SIGNIFICANT CHANGE UP
HCT VFR BLD CALC: 35.8 % — SIGNIFICANT CHANGE UP (ref 34.5–45)
HGB BLD-MCNC: 12.1 G/DL — SIGNIFICANT CHANGE UP (ref 11.5–15.5)
IMM GRANULOCYTES NFR BLD AUTO: 0.2 % — SIGNIFICANT CHANGE UP (ref 0–0.9)
INR BLD: 1.09 RATIO — SIGNIFICANT CHANGE UP (ref 0.88–1.16)
LYMPHOCYTES # BLD AUTO: 2.65 K/UL — SIGNIFICANT CHANGE UP (ref 1–3.3)
LYMPHOCYTES # BLD AUTO: 29.4 % — SIGNIFICANT CHANGE UP (ref 13–44)
MCHC RBC-ENTMCNC: 30.6 PG — SIGNIFICANT CHANGE UP (ref 27–34)
MCHC RBC-ENTMCNC: 33.8 GM/DL — SIGNIFICANT CHANGE UP (ref 32–36)
MCV RBC AUTO: 90.6 FL — SIGNIFICANT CHANGE UP (ref 80–100)
MONOCYTES # BLD AUTO: 0.9 K/UL — SIGNIFICANT CHANGE UP (ref 0–0.9)
MONOCYTES NFR BLD AUTO: 10 % — SIGNIFICANT CHANGE UP (ref 2–14)
NEUTROPHILS # BLD AUTO: 5.22 K/UL — SIGNIFICANT CHANGE UP (ref 1.8–7.4)
NEUTROPHILS NFR BLD AUTO: 57.8 % — SIGNIFICANT CHANGE UP (ref 43–77)
PLATELET # BLD AUTO: 338 K/UL — SIGNIFICANT CHANGE UP (ref 150–400)
POTASSIUM SERPL-MCNC: 3.5 MMOL/L — SIGNIFICANT CHANGE UP (ref 3.5–5.3)
POTASSIUM SERPL-SCNC: 3.5 MMOL/L — SIGNIFICANT CHANGE UP (ref 3.5–5.3)
PROT SERPL-MCNC: 6.9 GM/DL — SIGNIFICANT CHANGE UP (ref 6–8.3)
PROTHROM AB SERPL-ACNC: 12.6 SEC — SIGNIFICANT CHANGE UP (ref 10.5–13.4)
RBC # BLD: 3.95 M/UL — SIGNIFICANT CHANGE UP (ref 3.8–5.2)
RBC # FLD: 12.4 % — SIGNIFICANT CHANGE UP (ref 10.3–14.5)
SODIUM SERPL-SCNC: 141 MMOL/L — SIGNIFICANT CHANGE UP (ref 135–145)
WBC # BLD: 9.02 K/UL — SIGNIFICANT CHANGE UP (ref 3.8–10.5)
WBC # FLD AUTO: 9.02 K/UL — SIGNIFICANT CHANGE UP (ref 3.8–10.5)

## 2023-07-04 PROCEDURE — 85025 COMPLETE CBC W/AUTO DIFF WBC: CPT

## 2023-07-04 PROCEDURE — 80053 COMPREHEN METABOLIC PANEL: CPT

## 2023-07-04 PROCEDURE — 85730 THROMBOPLASTIN TIME PARTIAL: CPT

## 2023-07-04 PROCEDURE — 85610 PROTHROMBIN TIME: CPT

## 2023-07-04 PROCEDURE — 86850 RBC ANTIBODY SCREEN: CPT

## 2023-07-04 PROCEDURE — 76830 TRANSVAGINAL US NON-OB: CPT | Mod: 26

## 2023-07-04 PROCEDURE — 99284 EMERGENCY DEPT VISIT MOD MDM: CPT

## 2023-07-04 PROCEDURE — 36415 COLL VENOUS BLD VENIPUNCTURE: CPT

## 2023-07-04 PROCEDURE — 86900 BLOOD TYPING SEROLOGIC ABO: CPT

## 2023-07-04 PROCEDURE — 99285 EMERGENCY DEPT VISIT HI MDM: CPT | Mod: 25

## 2023-07-04 PROCEDURE — 93975 VASCULAR STUDY: CPT

## 2023-07-04 PROCEDURE — 96374 THER/PROPH/DIAG INJ IV PUSH: CPT

## 2023-07-04 PROCEDURE — 76830 TRANSVAGINAL US NON-OB: CPT

## 2023-07-04 PROCEDURE — 93975 VASCULAR STUDY: CPT | Mod: 26

## 2023-07-04 PROCEDURE — 84702 CHORIONIC GONADOTROPIN TEST: CPT

## 2023-07-04 PROCEDURE — 86901 BLOOD TYPING SEROLOGIC RH(D): CPT

## 2023-07-04 RX ORDER — SODIUM CHLORIDE 9 MG/ML
1000 INJECTION INTRAMUSCULAR; INTRAVENOUS; SUBCUTANEOUS ONCE
Refills: 0 | Status: COMPLETED | OUTPATIENT
Start: 2023-07-04 | End: 2023-07-04

## 2023-07-04 RX ORDER — KETOROLAC TROMETHAMINE 30 MG/ML
30 SYRINGE (ML) INJECTION ONCE
Refills: 0 | Status: DISCONTINUED | OUTPATIENT
Start: 2023-07-04 | End: 2023-07-04

## 2023-07-04 RX ADMIN — Medication 30 MILLIGRAM(S): at 21:20

## 2023-07-04 RX ADMIN — SODIUM CHLORIDE 1000 MILLILITER(S): 9 INJECTION INTRAMUSCULAR; INTRAVENOUS; SUBCUTANEOUS at 21:20

## 2023-07-04 NOTE — ED STATDOCS - PROGRESS NOTE DETAILS
Used  [id number] to inform pt that her WBC, Hgb/and HCt were WNL.  No evidence of anemia with vaginal bleeding for 12 days and dizziness.  Bhcg < 1.  Pt is not pregnant.  Pelvic US without explanation for pt vag bleeding, Neg thickened endometrium, Neg fibroids, polyps.  Will dc home to f/u with GYN as outpt.  Pt does have GYN through [facility].  Will try to refer as well.  Deanna Reyes PA-C

## 2023-07-04 NOTE — ED STATDOCS - NS ED ATTENDING STATEMENT MOD
This was a shared visit with the AUNDREA. I reviewed and verified the documentation and independently performed the documented:

## 2023-07-04 NOTE — ED ADULT TRIAGE NOTE - CHIEF COMPLAINT QUOTE
pt c/o menstrual cramping x 12 days. Motrin this morning. Period usually lasts 8-10 days. - med hx. - allergies. Pt A&ox4, ambulatory, no s/s of distress.

## 2023-07-04 NOTE — ED STATDOCS - PATIENT PORTAL LINK FT
You can access the FollowMyHealth Patient Portal offered by [facility] by registering at the following website: [email or web address]. By joining [employer]’s FollowMyHealth portal, you will also be able to view your health information using other applications (apps) compatible with our system.

## 2023-07-04 NOTE — ED STATDOCS - NSFOLLOWUPINSTRUCTIONS_ED_ALL_ED_FT
Menorragia  Menorrhagia  La menorragia es david forma de sangrado uterino anormal en la cual los períodos menstruales son abundantes o freeman más de lo normal. Con la menorragia, los períodos pueden causar david pérdida de dameon abundante y cólicos abdominales que le impiden a la ene realizar rubi actividades habituales.    ¿Cuáles son las causas?  Las causas más frecuentes de esta afección incluyen las siguientes:  Pólipos o fibromas. Estos son crecimientos no cancerosos en el útero.  Un desequilibrio entre las hormonas estrógeno y progesterona.  Anovulación, que se produce cuando yanick de los ovarios no libera un óvulo mack yanick o más meses.  Un problema con la glándula tiroidea (hipotiroidismo).  Efectos secundarios por haberse colocado un dispositivo intrauterino (DIU).  Efectos secundarios de algunos medicamentos, kalpana antiinflamatorios no esteroideos (ALIS) o anticoagulantes.  Un trastorno hemorrágico que impide la correcta coagulación.  En algunos casos, se desconoce la causa de esta afección.    ¿Qué incrementa el riesgo?  Es más probable que se desarrolle esta afección si usted tiene cáncer de útero.    ¿Cuáles son los signos o síntomas?  Los síntomas de esta afección incluyen:  Tiene que cambiarse el apósito o el tampón cada 1 o 2 horas debido a que está empapado.  Necesita usar apósitos y tampones al mismo tiempo porque pierde demasiada dameon.  Debe levantarse para cambiarse el apósito o el tampón mack la noche.  Elimina coágulos más grandes que 1 pulgada (2.5 cm).  El sangrado dura más de 7 vignesh.  Tiene síntomas de niveles bajos de giulia (anemia), kalpana cansancio (fatiga) o falta de aire.  ¿Cómo se diagnostica?  Esta afección se puede diagnosticar en función de lo siguiente:  Un examen físico.  Rubi síntomas y antecedentes menstruales.  Estudios, kalpana, por ejemplo:  Análisis de dameon para verificar si está embarazada o tiene cambios hormonales, un trastorno de sangrado o de la tiroides, anemia u otros problemas.  Prueba de Papanicolaou para verificar cambios malignos, infecciones o inflamación.  Biopsia de endometrio. Esta prueba implica retirar david muestra de tejido del revestimiento del útero (endometrio) para examinarla con microscopio.  Ecografía pélvica. Amy estudio utiliza ondas de maru para milton imágenes del útero, los ovarios y la vagina. Las imágenes pueden mostrar si tiene fibromas u otros crecimientos.  Histeroscopia. Para esta prueba, se usa un tubo fulton y flexible, con david severino en el extremo (histeroscopio), para observar el interior del útero.  ¿Cómo se trata?  Es posible que no se requiera tratamiento para esta afección. En marleny de ser necesario, el mejor tratamiento para usted dependerá de lo siguiente:  Si necesita evitar un embarazo.  Si desea tener hijos en el futuro.  La causa y la gravedad del sangrado.  Lamb preferencia personal.  Medicamentos      Los medicamentos son el primer paso en el tratamiento. Puede recibir alguno de los siguientes tratamientos:  Métodos anticonceptivos hormonales. Estos tratamientos reducen el sangrado mack el período menstrual. Incluyen los siguientes:  Anticonceptivos orales.  Parche dérmico.  Anillo vaginal.  Inyecciones que se aplican cada 3 meses.  DIU hormonal.  Implantes que se colocan debajo de la piel.  Medicamentos que espesan la dameon y hacen más lento el sangrado.  Medicamentos que reducen la inflamación, kalpana el ibuprofeno.  Medicamentos que contienen david hormona artificial (sintética) llamada progestina.  Medicamentos que hacen que los ovarios dejen de funcionar mack un breve lapso.  Suplementos de giulia para tratar la anemia.  Cirugía    Si los medicamentos no resultan eficaces, puede ser necesaria david cirugía. Algunas opciones quirúrgicas son las siguientes:  Dilatación y curetaje (D&C). En amy procedimiento, el médico abre la parte más baja del útero (waylon uterino) y luego raspa o succiona tejido del endometrio. [place] reduce el sangrado menstrual.  Histeroscopia quirúrgica. En amy procedimiento, se utiliza un histeroscopio para observar el útero y ayudar en la extirpación de pólipos que pueden ser la causa de los períodos abundantes.  Ablación del endometrio. Se usan varias técnicas para destruir permanentemente todo el endometrio. Luego de la ablación del endometrio, la mayoría de las mujeres tienen escaso flujo menstrual, o no lo tienen. Amy procedimiento reduce la posibilidad de quedar embarazada.  Resección del endometrio. En amy procedimiento, se usa un asa de alambre electroquirúrgica para extirpar el endometrio. Amy procedimiento reduce la posibilidad de quedar embarazada.  Histerectomía. Es la remoción quirúrgica del útero. Es un procedimiento permanente que interrumpe los períodos menstruales. No es posible quedar embarazada luego de david histerectomía.  Siga estas instrucciones en lamb casa:  Medicamentos    Use los medicamentos de venta vega y los recetados solamente kalpana se lo haya indicado el médico. [place] incluye las píldoras de giulia.  No cambie ni reemplace los medicamentos sin consultarlo con el médico.  No tome aspirina ni medicamentos que contengan aspirina desde 1 semana antes ni mack el período menstrual. La aspirina puede hacer que el sangrado empeore.  Control del estreñimiento    Las píldoras de giulia pueden causar estreñimiento. Si francy suplementos de giulia recetados, es posible que tenga que adoptar estas medidas para prevenir o tratar el estreñimiento:  Lara suficiente líquido kalpana para mantener la orina de color amarillo pálido.  Usar medicamentos recetados o de venta vega.  Consumir alimentos ricos en fibra, kalpana frijoles, cereales integrales, y frutas y verduras frescas.  Limitar el consumo de alimentos ricos en grasa y azúcares procesados, kalpana los alimentos fritos o dulces.  Instrucciones generales    Si necesita cambiar el apósito o el tampón más de david vez cada 2 horas, limite lamb actividad hasta que el sangrado se detenga.  Siga david dieta balanceada, lo que incluye alimentos con alto contenido de giulia. Entre estos alimentos, se incluyen las verduras de hoja bhaskar, la carne, el hígado, los huevos y los panes y cereales integrales.  No trate de perder peso hasta que el sangrado anormal se detenga y los niveles de giulia en la dameon vuelvan a la normalidad. Si debe perder peso, hable con lamb médico para hacerlo de manera young.  Cumpla con todas las visitas de seguimiento. [place] es importante.  Comuníquese con un médico si:  Empapa un tampón o un apósito cada 1 o 2 horas, y esto le ocurre cada vez que tiene el período.  Necesita usar apósitos y tampones al mismo tiempo porque pierde demasiada dameon.  Tiene náuseas, vómitos, diarrea u otros problemas relacionados con los medicamentos que está tomando.  Solicite ayuda de inmediato si:  Empapa más de un apósito o un tampón en 1 hora.  Elimina coágulos más grandes que 1 pulgada (2.5 cm).  Siente que le falta el aire.  Siente que el corazón late demasiado rápido.  Se siente mareada o se desmaya.  Se siente muy débil o cansada.  Resumen  La menorragia es david forma de sangrado uterino anormal en la cual los períodos menstruales son abundantes o freeman más de lo normal.  Es posible que no se requiera tratamiento para esta afección. Si es necesario, puede incluir medicamentos o procedimientos.  Use los medicamentos de venta vega y los recetados solamente kalpana se lo haya indicado el médico. [place] incluye las píldoras de giulia.  Busque ayuda de inmediato si tiene sangrado abundante y empapa más de un apósito o tampón en 1 hora, si despide coágulos grandes o si se siente mareada, muy débil, cansada o le falta de aire.  Esta información no tiene kalpana fin reemplazar el consejo del médico. Asegúrese de hacerle al médico cualquier pregunta que tenga.    Document Revised: 10/22/2021 Document Reviewed: 09/24/2021

## 2023-07-04 NOTE — ED STATDOCS - OBJECTIVE STATEMENT
43 y/o female presents to the ED c/o heavy period for 12 days. Patient also endorses "belly button area" pain, a lot of blood, some dysuria, dizziness, weakness. Patient changes pad 4/5 times a day. Patient is not on BCP. Last regular period was 6/23, period has gotten stronger. Patient denies anything like this happening before. Patient was at hospital recently because syncope episode at work while on period. Patient denies anemia or ever receiving a blood transfusion. : [id number].

## 2023-07-04 NOTE — ED STATDOCS - CLINICAL SUMMARY MEDICAL DECISION MAKING FREE TEXT BOX
Labs and US negative.  Better after IVF, toradol.  Hemodynamically stable.  HCG negative.  D/c home in good condition, f/u with Gyn.

## 2023-09-05 ENCOUNTER — EMERGENCY (EMERGENCY)
Facility: HOSPITAL | Age: 43
LOS: 0 days | Discharge: HOME | End: 2023-09-05
Attending: EMERGENCY MEDICINE
Payer: COMMERCIAL

## 2023-09-05 VITALS
HEIGHT: 61 IN | SYSTOLIC BLOOD PRESSURE: 117 MMHG | OXYGEN SATURATION: 100 % | WEIGHT: 138.89 LBS | TEMPERATURE: 98 F | DIASTOLIC BLOOD PRESSURE: 72 MMHG | RESPIRATION RATE: 17 BRPM | HEART RATE: 69 BPM

## 2023-09-05 VITALS
TEMPERATURE: 98 F | RESPIRATION RATE: 18 BRPM | HEART RATE: 59 BPM | DIASTOLIC BLOOD PRESSURE: 66 MMHG | SYSTOLIC BLOOD PRESSURE: 100 MMHG | OXYGEN SATURATION: 100 %

## 2023-09-05 DIAGNOSIS — Z98.51 TUBAL LIGATION STATUS: ICD-10-CM

## 2023-09-05 DIAGNOSIS — N92.4 EXCESSIVE BLEEDING IN THE PREMENOPAUSAL PERIOD: ICD-10-CM

## 2023-09-05 DIAGNOSIS — N93.9 ABNORMAL UTERINE AND VAGINAL BLEEDING, UNSPECIFIED: ICD-10-CM

## 2023-09-05 DIAGNOSIS — Z45.09 ENCOUNTER FOR ADJUSTMENT AND MANAGEMENT OF OTHER CARDIAC DEVICE: Chronic | ICD-10-CM

## 2023-09-05 DIAGNOSIS — Z98.890 OTHER SPECIFIED POSTPROCEDURAL STATES: Chronic | ICD-10-CM

## 2023-09-05 DIAGNOSIS — Z98.51 TUBAL LIGATION STATUS: Chronic | ICD-10-CM

## 2023-09-05 LAB
ALBUMIN SERPL ELPH-MCNC: 3.9 G/DL — SIGNIFICANT CHANGE UP (ref 3.3–5)
ALP SERPL-CCNC: 69 U/L — SIGNIFICANT CHANGE UP (ref 40–120)
ALT FLD-CCNC: 19 U/L — SIGNIFICANT CHANGE UP (ref 12–78)
ANION GAP SERPL CALC-SCNC: 4 MMOL/L — LOW (ref 5–17)
APTT BLD: 28.1 SEC — SIGNIFICANT CHANGE UP (ref 24.5–35.6)
AST SERPL-CCNC: 20 U/L — SIGNIFICANT CHANGE UP (ref 15–37)
BASOPHILS # BLD AUTO: 0.05 K/UL — SIGNIFICANT CHANGE UP (ref 0–0.2)
BASOPHILS NFR BLD AUTO: 0.6 % — SIGNIFICANT CHANGE UP (ref 0–2)
BILIRUB SERPL-MCNC: 0.4 MG/DL — SIGNIFICANT CHANGE UP (ref 0.2–1.2)
BLD GP AB SCN SERPL QL: SIGNIFICANT CHANGE UP
BUN SERPL-MCNC: 7 MG/DL — SIGNIFICANT CHANGE UP (ref 7–23)
CALCIUM SERPL-MCNC: 9.2 MG/DL — SIGNIFICANT CHANGE UP (ref 8.5–10.1)
CHLORIDE SERPL-SCNC: 111 MMOL/L — HIGH (ref 96–108)
CO2 SERPL-SCNC: 25 MMOL/L — SIGNIFICANT CHANGE UP (ref 22–31)
CREAT SERPL-MCNC: 0.81 MG/DL — SIGNIFICANT CHANGE UP (ref 0.5–1.3)
EGFR: 91 ML/MIN/1.73M2 — SIGNIFICANT CHANGE UP
EOSINOPHIL # BLD AUTO: 0.06 K/UL — SIGNIFICANT CHANGE UP (ref 0–0.5)
EOSINOPHIL NFR BLD AUTO: 0.7 % — SIGNIFICANT CHANGE UP (ref 0–6)
GLUCOSE SERPL-MCNC: 106 MG/DL — HIGH (ref 70–99)
HCT VFR BLD CALC: 35.8 % — SIGNIFICANT CHANGE UP (ref 34.5–45)
HGB BLD-MCNC: 12 G/DL — SIGNIFICANT CHANGE UP (ref 11.5–15.5)
IMM GRANULOCYTES NFR BLD AUTO: 0.2 % — SIGNIFICANT CHANGE UP (ref 0–0.9)
INR BLD: 1.07 RATIO — SIGNIFICANT CHANGE UP (ref 0.85–1.18)
LYMPHOCYTES # BLD AUTO: 2.25 K/UL — SIGNIFICANT CHANGE UP (ref 1–3.3)
LYMPHOCYTES # BLD AUTO: 25.6 % — SIGNIFICANT CHANGE UP (ref 13–44)
MCHC RBC-ENTMCNC: 31.3 PG — SIGNIFICANT CHANGE UP (ref 27–34)
MCHC RBC-ENTMCNC: 33.5 GM/DL — SIGNIFICANT CHANGE UP (ref 32–36)
MCV RBC AUTO: 93.2 FL — SIGNIFICANT CHANGE UP (ref 80–100)
MONOCYTES # BLD AUTO: 0.58 K/UL — SIGNIFICANT CHANGE UP (ref 0–0.9)
MONOCYTES NFR BLD AUTO: 6.6 % — SIGNIFICANT CHANGE UP (ref 2–14)
NEUTROPHILS # BLD AUTO: 5.83 K/UL — SIGNIFICANT CHANGE UP (ref 1.8–7.4)
NEUTROPHILS NFR BLD AUTO: 66.3 % — SIGNIFICANT CHANGE UP (ref 43–77)
PLATELET # BLD AUTO: 311 K/UL — SIGNIFICANT CHANGE UP (ref 150–400)
POTASSIUM SERPL-MCNC: 3.8 MMOL/L — SIGNIFICANT CHANGE UP (ref 3.5–5.3)
POTASSIUM SERPL-SCNC: 3.8 MMOL/L — SIGNIFICANT CHANGE UP (ref 3.5–5.3)
PROT SERPL-MCNC: 7.7 GM/DL — SIGNIFICANT CHANGE UP (ref 6–8.3)
PROTHROM AB SERPL-ACNC: 12.1 SEC — SIGNIFICANT CHANGE UP (ref 9.5–13)
RBC # BLD: 3.84 M/UL — SIGNIFICANT CHANGE UP (ref 3.8–5.2)
RBC # FLD: 12.6 % — SIGNIFICANT CHANGE UP (ref 10.3–14.5)
SODIUM SERPL-SCNC: 140 MMOL/L — SIGNIFICANT CHANGE UP (ref 135–145)
WBC # BLD: 8.79 K/UL — SIGNIFICANT CHANGE UP (ref 3.8–10.5)
WBC # FLD AUTO: 8.79 K/UL — SIGNIFICANT CHANGE UP (ref 3.8–10.5)

## 2023-09-05 PROCEDURE — 86901 BLOOD TYPING SEROLOGIC RH(D): CPT

## 2023-09-05 PROCEDURE — 86850 RBC ANTIBODY SCREEN: CPT

## 2023-09-05 PROCEDURE — 80053 COMPREHEN METABOLIC PANEL: CPT

## 2023-09-05 PROCEDURE — 76830 TRANSVAGINAL US NON-OB: CPT

## 2023-09-05 PROCEDURE — 36415 COLL VENOUS BLD VENIPUNCTURE: CPT

## 2023-09-05 PROCEDURE — 76830 TRANSVAGINAL US NON-OB: CPT | Mod: 26

## 2023-09-05 PROCEDURE — 85730 THROMBOPLASTIN TIME PARTIAL: CPT

## 2023-09-05 PROCEDURE — 85610 PROTHROMBIN TIME: CPT

## 2023-09-05 PROCEDURE — 86900 BLOOD TYPING SEROLOGIC ABO: CPT

## 2023-09-05 PROCEDURE — 99284 EMERGENCY DEPT VISIT MOD MDM: CPT

## 2023-09-05 PROCEDURE — 85025 COMPLETE CBC W/AUTO DIFF WBC: CPT

## 2023-09-05 PROCEDURE — 99284 EMERGENCY DEPT VISIT MOD MDM: CPT | Mod: 25

## 2023-09-05 NOTE — ED ADULT NURSE NOTE - NSFALLUNIVINTERV_ED_ALL_ED
Bed/Stretcher in lowest position, wheels locked, appropriate side rails in place/Call bell, personal items and telephone in reach/Instruct patient to call for assistance before getting out of bed/chair/stretcher/Non-slip footwear applied when patient is off stretcher/[place] to call system/Physically safe environment - no spills, clutter or unnecessary equipment/Purposeful proactive rounding/Room/bathroom lighting operational, light cord in reach

## 2023-09-05 NOTE — ED ADULT NURSE NOTE - OBJECTIVE STATEMENT
Pt c/o vaginal bleeding for one month. pt  states she was seen by GYN and didn't received any treatment.

## 2023-09-05 NOTE — ED STATDOCS - PROGRESS NOTE DETAILS
pt presents with vaginal bleeding which she has been experiencing since July and has been to the ER two other occasions, has been to GYN last visit was last Friday and started on medication and has follow up in 2 weeks. Results explained to the patient and will follow up with OB for follow up

## 2023-09-05 NOTE — ED STATDOCS - OBJECTIVE STATEMENT
42 yo female presents to the ED c/o vaginal bleeding. Reports that her last LMP was 7/22 and has not stopped. Pt came in today because she filled up 6 pads within the last 3 hours and is feeling lightheaded. Pt notes that this is the first time that she had such heavy bleeding during her periods. Pt had prior irregular periods but was never bleeding as much as she is now. Pt followed with OBGYN on 7/4 but hasn't followed up with GYN since this episode began. No other complaints at this time.        used, id# [id number]

## 2023-09-05 NOTE — ED STATDOCS - CLINICAL SUMMARY MEDICAL DECISION MAKING FREE TEXT BOX
Pt with known menorrhagia. Will get labs to r/o anemia. Will check pelvic sono. Likely d/c with GYN follow up if not anemic.

## 2023-09-08 ENCOUNTER — APPOINTMENT (OUTPATIENT)
Dept: ULTRASOUND IMAGING | Facility: CLINIC | Age: 43
End: 2023-09-08

## 2023-11-13 ENCOUNTER — EMERGENCY (EMERGENCY)
Facility: HOSPITAL | Age: 43
LOS: 0 days | Discharge: HOME | End: 2023-11-13
Attending: STUDENT IN AN ORGANIZED HEALTH CARE EDUCATION/TRAINING PROGRAM
Payer: COMMERCIAL

## 2023-11-13 VITALS
HEART RATE: 69 BPM | RESPIRATION RATE: 16 BRPM | WEIGHT: 139.99 LBS | TEMPERATURE: 98 F | DIASTOLIC BLOOD PRESSURE: 77 MMHG | SYSTOLIC BLOOD PRESSURE: 111 MMHG | OXYGEN SATURATION: 100 % | HEIGHT: 61 IN

## 2023-11-13 VITALS
DIASTOLIC BLOOD PRESSURE: 68 MMHG | RESPIRATION RATE: 18 BRPM | OXYGEN SATURATION: 100 % | HEART RATE: 79 BPM | TEMPERATURE: 98 F | SYSTOLIC BLOOD PRESSURE: 101 MMHG

## 2023-11-13 DIAGNOSIS — R51.9 HEADACHE, UNSPECIFIED: ICD-10-CM

## 2023-11-13 DIAGNOSIS — R55 SYNCOPE AND COLLAPSE: ICD-10-CM

## 2023-11-13 DIAGNOSIS — Z45.09 ENCOUNTER FOR ADJUSTMENT AND MANAGEMENT OF OTHER CARDIAC DEVICE: Chronic | ICD-10-CM

## 2023-11-13 DIAGNOSIS — Z98.890 OTHER SPECIFIED POSTPROCEDURAL STATES: Chronic | ICD-10-CM

## 2023-11-13 DIAGNOSIS — Z98.51 TUBAL LIGATION STATUS: Chronic | ICD-10-CM

## 2023-11-13 LAB
ALBUMIN SERPL ELPH-MCNC: 3.8 G/DL — SIGNIFICANT CHANGE UP (ref 3.3–5)
ALBUMIN SERPL ELPH-MCNC: 3.8 G/DL — SIGNIFICANT CHANGE UP (ref 3.3–5)
ALP SERPL-CCNC: 72 U/L — SIGNIFICANT CHANGE UP (ref 40–120)
ALP SERPL-CCNC: 72 U/L — SIGNIFICANT CHANGE UP (ref 40–120)
ALT FLD-CCNC: 22 U/L — SIGNIFICANT CHANGE UP (ref 12–78)
ALT FLD-CCNC: 22 U/L — SIGNIFICANT CHANGE UP (ref 12–78)
ANION GAP SERPL CALC-SCNC: 5 MMOL/L — SIGNIFICANT CHANGE UP (ref 5–17)
ANION GAP SERPL CALC-SCNC: 5 MMOL/L — SIGNIFICANT CHANGE UP (ref 5–17)
APPEARANCE UR: CLEAR — SIGNIFICANT CHANGE UP
APPEARANCE UR: CLEAR — SIGNIFICANT CHANGE UP
APTT BLD: 29.1 SEC — SIGNIFICANT CHANGE UP (ref 24.5–35.6)
APTT BLD: 29.1 SEC — SIGNIFICANT CHANGE UP (ref 24.5–35.6)
AST SERPL-CCNC: 18 U/L — SIGNIFICANT CHANGE UP (ref 15–37)
AST SERPL-CCNC: 18 U/L — SIGNIFICANT CHANGE UP (ref 15–37)
BACTERIA # UR AUTO: NEGATIVE /HPF — SIGNIFICANT CHANGE UP
BACTERIA # UR AUTO: NEGATIVE /HPF — SIGNIFICANT CHANGE UP
BASOPHILS # BLD AUTO: 0.06 K/UL — SIGNIFICANT CHANGE UP (ref 0–0.2)
BASOPHILS # BLD AUTO: 0.06 K/UL — SIGNIFICANT CHANGE UP (ref 0–0.2)
BASOPHILS NFR BLD AUTO: 0.7 % — SIGNIFICANT CHANGE UP (ref 0–2)
BASOPHILS NFR BLD AUTO: 0.7 % — SIGNIFICANT CHANGE UP (ref 0–2)
BILIRUB SERPL-MCNC: 0.5 MG/DL — SIGNIFICANT CHANGE UP (ref 0.2–1.2)
BILIRUB SERPL-MCNC: 0.5 MG/DL — SIGNIFICANT CHANGE UP (ref 0.2–1.2)
BILIRUB UR-MCNC: NEGATIVE — SIGNIFICANT CHANGE UP
BILIRUB UR-MCNC: NEGATIVE — SIGNIFICANT CHANGE UP
BUN SERPL-MCNC: 11 MG/DL — SIGNIFICANT CHANGE UP (ref 7–23)
BUN SERPL-MCNC: 11 MG/DL — SIGNIFICANT CHANGE UP (ref 7–23)
CALCIUM SERPL-MCNC: 8.7 MG/DL — SIGNIFICANT CHANGE UP (ref 8.5–10.1)
CALCIUM SERPL-MCNC: 8.7 MG/DL — SIGNIFICANT CHANGE UP (ref 8.5–10.1)
CAST: 0 /LPF — SIGNIFICANT CHANGE UP (ref 0–4)
CAST: 0 /LPF — SIGNIFICANT CHANGE UP (ref 0–4)
CHLORIDE SERPL-SCNC: 109 MMOL/L — HIGH (ref 96–108)
CHLORIDE SERPL-SCNC: 109 MMOL/L — HIGH (ref 96–108)
CO2 SERPL-SCNC: 27 MMOL/L — SIGNIFICANT CHANGE UP (ref 22–31)
CO2 SERPL-SCNC: 27 MMOL/L — SIGNIFICANT CHANGE UP (ref 22–31)
COLOR SPEC: YELLOW — SIGNIFICANT CHANGE UP
COLOR SPEC: YELLOW — SIGNIFICANT CHANGE UP
CREAT SERPL-MCNC: 0.93 MG/DL — SIGNIFICANT CHANGE UP (ref 0.5–1.3)
CREAT SERPL-MCNC: 0.93 MG/DL — SIGNIFICANT CHANGE UP (ref 0.5–1.3)
DIFF PNL FLD: ABNORMAL
DIFF PNL FLD: ABNORMAL
EGFR: 77 ML/MIN/1.73M2 — SIGNIFICANT CHANGE UP
EGFR: 77 ML/MIN/1.73M2 — SIGNIFICANT CHANGE UP
EOSINOPHIL # BLD AUTO: 0.04 K/UL — SIGNIFICANT CHANGE UP (ref 0–0.5)
EOSINOPHIL # BLD AUTO: 0.04 K/UL — SIGNIFICANT CHANGE UP (ref 0–0.5)
EOSINOPHIL NFR BLD AUTO: 0.5 % — SIGNIFICANT CHANGE UP (ref 0–6)
EOSINOPHIL NFR BLD AUTO: 0.5 % — SIGNIFICANT CHANGE UP (ref 0–6)
GLUCOSE BLDC GLUCOMTR-MCNC: 110 MG/DL — HIGH (ref 70–99)
GLUCOSE BLDC GLUCOMTR-MCNC: 110 MG/DL — HIGH (ref 70–99)
GLUCOSE SERPL-MCNC: 111 MG/DL — HIGH (ref 70–99)
GLUCOSE SERPL-MCNC: 111 MG/DL — HIGH (ref 70–99)
GLUCOSE UR QL: NEGATIVE MG/DL — SIGNIFICANT CHANGE UP
GLUCOSE UR QL: NEGATIVE MG/DL — SIGNIFICANT CHANGE UP
HCG SERPL-ACNC: <1 MIU/ML — SIGNIFICANT CHANGE UP
HCG SERPL-ACNC: <1 MIU/ML — SIGNIFICANT CHANGE UP
HCT VFR BLD CALC: 38.3 % — SIGNIFICANT CHANGE UP (ref 34.5–45)
HCT VFR BLD CALC: 38.3 % — SIGNIFICANT CHANGE UP (ref 34.5–45)
HGB BLD-MCNC: 12.7 G/DL — SIGNIFICANT CHANGE UP (ref 11.5–15.5)
HGB BLD-MCNC: 12.7 G/DL — SIGNIFICANT CHANGE UP (ref 11.5–15.5)
IMM GRANULOCYTES NFR BLD AUTO: 0.2 % — SIGNIFICANT CHANGE UP (ref 0–0.9)
IMM GRANULOCYTES NFR BLD AUTO: 0.2 % — SIGNIFICANT CHANGE UP (ref 0–0.9)
INR BLD: 1.14 RATIO — SIGNIFICANT CHANGE UP (ref 0.85–1.18)
INR BLD: 1.14 RATIO — SIGNIFICANT CHANGE UP (ref 0.85–1.18)
KETONES UR-MCNC: NEGATIVE MG/DL — SIGNIFICANT CHANGE UP
KETONES UR-MCNC: NEGATIVE MG/DL — SIGNIFICANT CHANGE UP
LEUKOCYTE ESTERASE UR-ACNC: ABNORMAL
LEUKOCYTE ESTERASE UR-ACNC: ABNORMAL
LYMPHOCYTES # BLD AUTO: 1.17 K/UL — SIGNIFICANT CHANGE UP (ref 1–3.3)
LYMPHOCYTES # BLD AUTO: 1.17 K/UL — SIGNIFICANT CHANGE UP (ref 1–3.3)
LYMPHOCYTES # BLD AUTO: 13.7 % — SIGNIFICANT CHANGE UP (ref 13–44)
LYMPHOCYTES # BLD AUTO: 13.7 % — SIGNIFICANT CHANGE UP (ref 13–44)
MAGNESIUM SERPL-MCNC: 2.1 MG/DL — SIGNIFICANT CHANGE UP (ref 1.6–2.6)
MAGNESIUM SERPL-MCNC: 2.1 MG/DL — SIGNIFICANT CHANGE UP (ref 1.6–2.6)
MCHC RBC-ENTMCNC: 30 PG — SIGNIFICANT CHANGE UP (ref 27–34)
MCHC RBC-ENTMCNC: 30 PG — SIGNIFICANT CHANGE UP (ref 27–34)
MCHC RBC-ENTMCNC: 33.2 GM/DL — SIGNIFICANT CHANGE UP (ref 32–36)
MCHC RBC-ENTMCNC: 33.2 GM/DL — SIGNIFICANT CHANGE UP (ref 32–36)
MCV RBC AUTO: 90.5 FL — SIGNIFICANT CHANGE UP (ref 80–100)
MCV RBC AUTO: 90.5 FL — SIGNIFICANT CHANGE UP (ref 80–100)
MONOCYTES # BLD AUTO: 0.54 K/UL — SIGNIFICANT CHANGE UP (ref 0–0.9)
MONOCYTES # BLD AUTO: 0.54 K/UL — SIGNIFICANT CHANGE UP (ref 0–0.9)
MONOCYTES NFR BLD AUTO: 6.3 % — SIGNIFICANT CHANGE UP (ref 2–14)
MONOCYTES NFR BLD AUTO: 6.3 % — SIGNIFICANT CHANGE UP (ref 2–14)
NEUTROPHILS # BLD AUTO: 6.73 K/UL — SIGNIFICANT CHANGE UP (ref 1.8–7.4)
NEUTROPHILS # BLD AUTO: 6.73 K/UL — SIGNIFICANT CHANGE UP (ref 1.8–7.4)
NEUTROPHILS NFR BLD AUTO: 78.6 % — HIGH (ref 43–77)
NEUTROPHILS NFR BLD AUTO: 78.6 % — HIGH (ref 43–77)
NITRITE UR-MCNC: NEGATIVE — SIGNIFICANT CHANGE UP
NITRITE UR-MCNC: NEGATIVE — SIGNIFICANT CHANGE UP
PH UR: 6 — SIGNIFICANT CHANGE UP (ref 5–8)
PH UR: 6 — SIGNIFICANT CHANGE UP (ref 5–8)
PLATELET # BLD AUTO: 350 K/UL — SIGNIFICANT CHANGE UP (ref 150–400)
PLATELET # BLD AUTO: 350 K/UL — SIGNIFICANT CHANGE UP (ref 150–400)
POTASSIUM SERPL-MCNC: 3.7 MMOL/L — SIGNIFICANT CHANGE UP (ref 3.5–5.3)
POTASSIUM SERPL-MCNC: 3.7 MMOL/L — SIGNIFICANT CHANGE UP (ref 3.5–5.3)
POTASSIUM SERPL-SCNC: 3.7 MMOL/L — SIGNIFICANT CHANGE UP (ref 3.5–5.3)
POTASSIUM SERPL-SCNC: 3.7 MMOL/L — SIGNIFICANT CHANGE UP (ref 3.5–5.3)
PROT SERPL-MCNC: 7.9 GM/DL — SIGNIFICANT CHANGE UP (ref 6–8.3)
PROT SERPL-MCNC: 7.9 GM/DL — SIGNIFICANT CHANGE UP (ref 6–8.3)
PROT UR-MCNC: NEGATIVE MG/DL — SIGNIFICANT CHANGE UP
PROT UR-MCNC: NEGATIVE MG/DL — SIGNIFICANT CHANGE UP
PROTHROM AB SERPL-ACNC: 12.8 SEC — SIGNIFICANT CHANGE UP (ref 9.5–13)
PROTHROM AB SERPL-ACNC: 12.8 SEC — SIGNIFICANT CHANGE UP (ref 9.5–13)
RBC # BLD: 4.23 M/UL — SIGNIFICANT CHANGE UP (ref 3.8–5.2)
RBC # BLD: 4.23 M/UL — SIGNIFICANT CHANGE UP (ref 3.8–5.2)
RBC # FLD: 13.2 % — SIGNIFICANT CHANGE UP (ref 10.3–14.5)
RBC # FLD: 13.2 % — SIGNIFICANT CHANGE UP (ref 10.3–14.5)
RBC CASTS # UR COMP ASSIST: 12 /HPF — HIGH (ref 0–4)
RBC CASTS # UR COMP ASSIST: 12 /HPF — HIGH (ref 0–4)
SODIUM SERPL-SCNC: 141 MMOL/L — SIGNIFICANT CHANGE UP (ref 135–145)
SODIUM SERPL-SCNC: 141 MMOL/L — SIGNIFICANT CHANGE UP (ref 135–145)
SP GR SPEC: 1.01 — SIGNIFICANT CHANGE UP (ref 1–1.03)
SP GR SPEC: 1.01 — SIGNIFICANT CHANGE UP (ref 1–1.03)
SQUAMOUS # UR AUTO: 7 /HPF — HIGH (ref 0–5)
SQUAMOUS # UR AUTO: 7 /HPF — HIGH (ref 0–5)
TROPONIN I, HIGH SENSITIVITY RESULT: 23.29 NG/L — SIGNIFICANT CHANGE UP
TROPONIN I, HIGH SENSITIVITY RESULT: 23.29 NG/L — SIGNIFICANT CHANGE UP
UROBILINOGEN FLD QL: 0.2 MG/DL — SIGNIFICANT CHANGE UP (ref 0.2–1)
UROBILINOGEN FLD QL: 0.2 MG/DL — SIGNIFICANT CHANGE UP (ref 0.2–1)
WBC # BLD: 8.56 K/UL — SIGNIFICANT CHANGE UP (ref 3.8–10.5)
WBC # BLD: 8.56 K/UL — SIGNIFICANT CHANGE UP (ref 3.8–10.5)
WBC # FLD AUTO: 8.56 K/UL — SIGNIFICANT CHANGE UP (ref 3.8–10.5)
WBC # FLD AUTO: 8.56 K/UL — SIGNIFICANT CHANGE UP (ref 3.8–10.5)
WBC UR QL: 1 /HPF — SIGNIFICANT CHANGE UP (ref 0–5)
WBC UR QL: 1 /HPF — SIGNIFICANT CHANGE UP (ref 0–5)

## 2023-11-13 PROCEDURE — 82962 GLUCOSE BLOOD TEST: CPT

## 2023-11-13 PROCEDURE — 81001 URINALYSIS AUTO W/SCOPE: CPT

## 2023-11-13 PROCEDURE — 71045 X-RAY EXAM CHEST 1 VIEW: CPT | Mod: 26

## 2023-11-13 PROCEDURE — 70450 CT HEAD/BRAIN W/O DYE: CPT | Mod: MA

## 2023-11-13 PROCEDURE — 87086 URINE CULTURE/COLONY COUNT: CPT

## 2023-11-13 PROCEDURE — 71045 X-RAY EXAM CHEST 1 VIEW: CPT

## 2023-11-13 PROCEDURE — 36415 COLL VENOUS BLD VENIPUNCTURE: CPT

## 2023-11-13 PROCEDURE — 99285 EMERGENCY DEPT VISIT HI MDM: CPT | Mod: 25

## 2023-11-13 PROCEDURE — 93010 ELECTROCARDIOGRAM REPORT: CPT

## 2023-11-13 PROCEDURE — 84702 CHORIONIC GONADOTROPIN TEST: CPT

## 2023-11-13 PROCEDURE — 80053 COMPREHEN METABOLIC PANEL: CPT

## 2023-11-13 PROCEDURE — 85730 THROMBOPLASTIN TIME PARTIAL: CPT

## 2023-11-13 PROCEDURE — 70450 CT HEAD/BRAIN W/O DYE: CPT | Mod: 26,MA

## 2023-11-13 PROCEDURE — 85025 COMPLETE CBC W/AUTO DIFF WBC: CPT

## 2023-11-13 PROCEDURE — 99285 EMERGENCY DEPT VISIT HI MDM: CPT

## 2023-11-13 PROCEDURE — 83735 ASSAY OF MAGNESIUM: CPT

## 2023-11-13 PROCEDURE — 85610 PROTHROMBIN TIME: CPT

## 2023-11-13 PROCEDURE — 93005 ELECTROCARDIOGRAM TRACING: CPT

## 2023-11-13 PROCEDURE — 84484 ASSAY OF TROPONIN QUANT: CPT

## 2023-11-13 RX ORDER — ACETAMINOPHEN 500 MG
650 TABLET ORAL ONCE
Refills: 0 | Status: COMPLETED | OUTPATIENT
Start: 2023-11-13 | End: 2023-11-13

## 2023-11-13 RX ADMIN — Medication 650 MILLIGRAM(S): at 09:46

## 2023-11-13 NOTE — ED ADULT NURSE NOTE - NSFALLRISKINTERV_ED_ALL_ED
Assistance OOB with selected safe patient handling equipment if applicable/Communicate fall risk and risk factors to all staff, patient, and family/Orthostatic vital signs/Provide visual cue: yellow wristband, yellow gown, etc/Reinforce activity limits and safety measures with patient and family/Call bell, personal items and telephone in reach/Instruct patient to call for assistance before getting out of bed/chair/stretcher/Non-slip footwear applied when patient is off stretcher/[place] to call system/Physically safe environment - no spills, clutter or unnecessary equipment/Purposeful Proactive Rounding/Room/bathroom lighting operational, light cord in reach

## 2023-11-13 NOTE — ED PROVIDER NOTE - PATIENT PORTAL LINK FT
You can access the FollowMyHealth Patient Portal offered by [facility] by registering at the following website: [email or web address]. By joining [employer]’s FollowMyHealth portal, you will also be able to view your health information using other applications (apps) compatible with our system.

## 2023-11-13 NOTE — ED ADULT TRIAGE NOTE - NS ED NURSE BANDS TYPE
Martha Jamil is a 77year old female. HPI:   Patient presents with:  Checkup: Abd discomfort, feeling ill     Montgomery Reji indicates that she feels she may have an infection. She has left lower quadrant discomfort.   Her bowels are now a little bit na 11/1/2021)     • Vitamin D3, Cholecalciferol, 1000 UNITS Oral Cap 1,000 Units. (Patient not taking: Reported on 11/1/2021)     • Calcium Carb-Cholecalciferol 600-800 MG-UNIT Oral Tab Take 1 tablet by mouth daily.  (Patient not taking: Reported on 11/1/2021) colonoscopy early December  - CBC WITH DIFFERENTIAL WITH PLATELET; Future  - COMP METABOLIC PANEL (14); Future  - SED RATE, WESTERGREN (AUTOMATED);  Future  - C-REACTIVE PROTEIN; Future  - TSH W REFLEX TO FREE T4; Future  - URINALYSIS WITH CULTURE REFLEX  - Name band;

## 2023-11-13 NOTE — ED PROVIDER NOTE - NSICDXPASTSURGICALHX_GEN_ALL_CORE_FT
Medication adherence call regarding Pravastatin 20 mg 1 every day . Mr. Julio Mora has some medication left from last refill . He has enough medication from last refill to last until appointment 7/3/2019 with Mago Brewer . Mrs Julio Mora states her  has been using a pill minder. Advised her to put a note on the refrigerator . to see if that would help remind him to take his medication. Recent  visit with the [facility] with lab work drawn. Copy of labs given to PCP per wife. Will outreach with Mr. Julio Mora after scheduled appointment with PCP . PAST SURGICAL HISTORY:  Encounter for loop recorder at end of battery life     H/O tubal ligation     History of loop recorder

## 2023-11-13 NOTE — ED PROVIDER NOTE - CLINICAL SUMMARY MEDICAL DECISION MAKING FREE TEXT BOX
44 yo with syncope with prodromal symptoms. Will get labs, CT head, EKG, CXR, urine, reeval. 42 yo with syncope with prodromal symptoms. Will get labs, CT head, EKG, CXR, urine, reeval.    [place] DO: ambulatory in ED with steady gait; no complaints; work up non diagnostic; f/u with pmd as instructed.

## 2023-11-13 NOTE — ED PROVIDER NOTE - GASTROINTESTINAL, MLM
"Goal Outcome Evaluation:       Reason for hospital stay:  L ankle fracture   Living situation PTA: home alone   Most recent vitals: /80 (BP Location: Left arm, Patient Position: Semi-Neal's, Cuff Size: Adult Regular)   Pulse 87   Temp (!) 96.6  F (35.9  C) (Tympanic)   Resp 18   Ht 1.727 m (5' 8\")   Wt 95.8 kg (211 lb 3.2 oz)   SpO2 93%   BMI 32.11 kg/m      Pain Management:  tylenol 650mg and roxx1lh 1x  LOC:  A&O  Cardiac:  apical irregular   Respiratory:  WNL - room air  GI:  WNL  :  dribbling  Skin Issues:  please see charting     IVF:  saline locked  ABX:  none    Nutrition: regular  ADL's:  assist 1  Ambulation:assist 2 with walker and gait belt   Safety:  call light in place, bed alarm on, chair alarm on, bed rails raised x3, bed in low position with wheels locked     Comments: please see note about patient crawling out of bed       Face to face report given with opportunity to observe patient.    Report given to Ale Ellis RN   11/6/2022  7:25 AM                       " Abdomen soft, non-tender, no guarding.

## 2023-11-13 NOTE — ED PROVIDER NOTE - NSFOLLOWUPINSTRUCTIONS_ED_ALL_ED_FT
Síncope en los adultos  Syncope, Adult  Outline of the head showing blood vessels that supply the brain.  El síncope hace referencia a david afección en la cual david persona pierde la conciencia temporalmente. También se lo puede conocer kalpana desmayo. La causa del síncope es david disminución súbita del flujo de dameon al cerebro. Puede suceder por diversos motivos.    La mayoría de las causas del síncope no son peligrosas. Puede desencadenarse por cosas tales kalpana pinchazos de agujas, anthony dameon, sentir dolor o emociones intensas. Sin embargo, el síncope también puede ser un signo de un problema médico grave, kalpana david anomalía cardíaca. Otras causas pueden ser la deshidratación, las migrañas o milton medicamentos que bajan la presión arterial. El médico puede hacerle estudios para encontrar el motivo por el cual tiene un síncope.    Ante un desmayo, obtenga ayuda médica de inmediato. Comuníquese con el servicio de emergencias de lopez localidad (911 en los [place]).    Siga estas indicaciones en lopez casa:  Esté atento a cualquier cambio en los síntomas. Para mantener lopez seguridad y ayudar a aliviar james síntomas, tome estas medidas:    Cómo saber cuándo puede estar a punto de desmayarse    Los signos de que alguien está por desmayarse incluyen lo siguiente:  Sentirse mareado, débil, aturdido o kalpana si la habitación estuviera girando.  Sentir náuseas.  Anthony manchas o anthony todo solares o todo selena en el [place] de visión.  Tener la piel fría y húmeda o sentir calor y sudar.  Tener un zumbido en los oídos (acúfenos).  Si comienza a sentir que podría desmayarse, siéntese o recuéstese inmediatamente. Si se sienta, baje la britt y colóquela entre las piernas. Si se recuesta, levante (eleve) los pies por encima del nivel del corazón.  Respire profundamente y de manera continua. Espere hasta que los síntomas hayan desaparecido.  Pídale a alguien que se quede con usted hasta que se sienta estable.  Medicamentos    Use los medicamentos de venta vega y los recetados solamente kalpana se lo haya indicado el médico.  Si está tomando medicamentos para la presión arterial o para el corazón, póngase de pie lentamente, tómese algunos minutos para permanecer sentado y luego párese. [place] puede reducir los mareos y el riesgo de tener un síncope.  Estilo de roshan    No conduzca vehículos, no use maquinarias ni practique deportes hasta que el médico lo autorice.  No cristian alcohol.  No consuma ningún producto que contenga nicotina o tabaco. Estos productos incluyen cigarrillos, tabaco para mascar y aparatos de vapeo, kalpana los cigarrillos electrónicos. Si necesita ayuda para dejar de consumir estos productos, consulte al médico.  Evite saunas y bañeras de hidromasajes.  Indicaciones generales    [place] con el médico acerca de james síntomas. Es posible que deba realizarse estudios para entender la causa del síncope.  Cristian suficiente líquido kalpana para mantener la orina de color amarillo pálido.  Evite permanecer de pie mucho tiempo. Si debe permanecer de pie mack mucho tiempo, realice movimientos kalpana los siguientes:   las piernas.  Cruzar las piernas.  Flexionar y estirar los músculos de las piernas.  Ponerse en cuclillas.  Concurra a todas las visitas de seguimiento. [place] es importante.  Comuníquese con un médico si:  Tiene episodios kalpana si fuera a desmayarse.  Solicite ayuda de inmediato si:  Se desmaya.  Se golpea la britt o se lesiona después de desmayarse.  Tiene cualquiera de estos síntomas que pueden indicar problemas en el corazón:  Latidos cardíacos acelerados o irregulares (palpitaciones).  Dolor fuera de lo normal en el pecho, el abdomen o la espalda.  Falta de aire.  Tiene david convulsión.  Siente un dolor de britt intenso.  Se siente confundido.  Tiene problemas de visión.  Tiene debilidad intensa o dificultad para caminar.  Sangra por la boca o el recto, o james heces son de color selena o aspecto alquitranado.  Estos síntomas pueden representar un problema grave que constituye david emergencia. No espere hasta que los síntomas desaparezcan. Solicite atención médica de inmediato. Comuníquese con el servicio de emergencias de lopez localidad (911 en los [place]). No conduzca por james propios medios hasta el hospital.    Resumen  El síncope hace referencia a david afección en la cual david persona pierde la conciencia temporalmente. También se lo puede conocer kalpana desmayo. La causa del síncope es david disminución súbita del flujo de dameon al cerebro.  Los signos de que puede estar por desmayarse incluyen mareos, sensación de desvanecimiento, náuseas, cambios repentinos en la visión o piel fría y húmeda.  Aunque la mayoría de las causas no implican un peligro, el síncope puede ser un signo de un problema médico grave. Si se desmaya, solicite ayuda de inmediato.  Si comienza a sentir que podría desmayarse, siéntese o recuéstese inmediatamente. Si se sienta, baje la britt y colóquela entre las piernas. Si se recuesta, levante (eleve) los pies por encima del nivel del corazón.  Esta información no tiene kalpana fin reemplazar el consejo del médico. Asegúrese de hacerle al médico cualquier pregunta que tenga.

## 2023-11-13 NOTE — ED PROVIDER NOTE - OBJECTIVE STATEMENT
44 yo female w/PMHx of syncope in the past presents to the ED s/p syncope. Pt was dizzy this morning when she passed out and hit her head. Denies CP, SOB, or any other symptoms. Pt still had a mild HA on arrival.

## 2023-11-13 NOTE — ED ADULT TRIAGE NOTE - CHIEF COMPLAINT QUOTE
Pt pw syncopal episode at work, down for approx 10 min. Pt endorses +HS, +LOC, -AC at this time. Pt was at work when pt felt lightheaded, and fell to floor at work. Pt endroses eating breakfast this AM and has had multiple syncopal episodes at work.

## 2023-11-13 NOTE — ED ADULT NURSE NOTE - OBJECTIVE STATEMENT
42 y/o female with hx syncope and prediabetes presents to ER s/p syncopal episode preceded by prodrome of dizziness. + head strike. pt currently c/o head pain, posterior neck pain, back pain and dizziness. no notable head trauma upon physical exam. pt is not on AC therapy. at baseline mental status, GCS 15 at this time, anox3. LMP ended yesterday.

## 2023-11-13 NOTE — ED ADULT NURSE NOTE - CAS EDN DISCHARGE INTERVENTIONS
Here for  hormone therapy injection, no complaints at this time , Injection given as ordered, tolerated well, no report of pain prior to or after injection. Return to clinic as scheduled.     Site - LB    Testosterone  76  mg    Clinic Supplied Medication   IV discontinued, cath removed intact

## 2023-11-14 LAB
CULTURE RESULTS: ABNORMAL
SPECIMEN SOURCE: SIGNIFICANT CHANGE UP
SPECIMEN SOURCE: SIGNIFICANT CHANGE UP

## 2023-11-15 RX ORDER — CEPHALEXIN 500 MG
1 CAPSULE ORAL
Qty: 28 | Refills: 0
Start: 2023-11-15 | End: 2023-11-21

## 2023-11-15 NOTE — ED POST DISCHARGE NOTE - DETAILS
Using SI, Pt states she was having lower pelvic pain. UC shows sensitivity to 1st gen ceph/pcn. Will send rx for keflex and have pt f/u with pmd. Advised to take motrin/tylenol for pain, plenty of fluids and f/u with pmd. -Wagner Zapien PA-C

## 2024-01-12 ENCOUNTER — RESULT REVIEW (OUTPATIENT)
Age: 44
End: 2024-01-12

## 2024-01-12 NOTE — ED ADULT NURSE NOTE - NSSEPSISNEWALTERMENTAL_ED_A_ED
Goal Outcome Evaluation:      Plan of Care Reviewed With: patient    Overall Patient Progress: no change    Outcome Evaluation: Pt. Alert and oriented x4; Uses call light appropriately. Denies pain SOB and chest pain this shift. BG checked at 2am- 85mg/dl, snacks offered. Continent both bowel and bladder uses bathroom toilet with assist A1 w/ walker and transferbelt. Fall precaution in place. Safety checks done and completed.       No

## 2024-01-19 ENCOUNTER — APPOINTMENT (OUTPATIENT)
Dept: MAMMOGRAPHY | Facility: CLINIC | Age: 44
End: 2024-01-19

## 2024-01-29 NOTE — ED STATDOCS - PATIENT PORTAL LINK FT
Alert and oriented to person, place and time You can access the FollowMyHealth Patient Portal offered by [facility] by registering at the following website: [email or web address]. By joining [employer]’s FollowMyHealth portal, you will also be able to view your health information using other applications (apps) compatible with our system.

## 2024-02-02 ENCOUNTER — APPOINTMENT (OUTPATIENT)
Dept: UROLOGY | Facility: CLINIC | Age: 44
End: 2024-02-02
Payer: COMMERCIAL

## 2024-02-02 VITALS
SYSTOLIC BLOOD PRESSURE: 95 MMHG | RESPIRATION RATE: 16 BRPM | OXYGEN SATURATION: 100 % | BODY MASS INDEX: 27.48 KG/M2 | DIASTOLIC BLOOD PRESSURE: 59 MMHG | HEART RATE: 81 BPM | WEIGHT: 140 LBS | HEIGHT: 60 IN

## 2024-02-02 DIAGNOSIS — N95.2 POSTMENOPAUSAL ATROPHIC VAGINITIS: ICD-10-CM

## 2024-02-02 PROCEDURE — 99204 OFFICE O/P NEW MOD 45 MIN: CPT

## 2024-02-02 RX ORDER — NITROFURANTOIN MACROCRYSTALS 50 MG/1
50 CAPSULE ORAL
Qty: 30 | Refills: 11 | Status: ACTIVE | COMMUNITY
Start: 2024-02-02 | End: 1900-01-01

## 2024-02-02 NOTE — END OF VISIT
[FreeTextEntry3] : Recommendations: She is going to start taking Nitrofurantoin Macrocrystal 50 MG Oral Capsule; 1 tab PO 1 hour prior to relations, max 3day prior relations and serum is sent for total estrogens. She will follow up here with a sonogram of the kidneys and bladder and cystoscopy with urodynamics as well and plans to follow.

## 2024-02-02 NOTE — HISTORY OF PRESENT ILLNESS
[FreeTextEntry1] : Initial 02/02/2024: 42 y/o F patient has been having recurrent urinary tract infections over the past year period. She also has begun to note dyspareunia as well. She denies a prior episode of urinary tract problems.

## 2024-02-02 NOTE — ASSESSMENT
[FreeTextEntry1] : Imp: Probable atrophic vaginal changes and sexually induced urinary tract infections.

## 2024-02-02 NOTE — REVIEW OF SYSTEMS
[Negative] : Heme/Lymph [Feeling Tired] : feeling tired [Dry Eyes] : dryness of the eyes [Abdominal Pain] : abdominal pain [Constipation] : constipation [Heartburn] : heartburn [Loss of interest] : loss of interest in sexual activity [Joint Pain] : joint pain [Joint Swelling] : joint swelling [Itching] : itching [Dizziness] : dizziness [Hot Flashes] : hot flashes [Feelings Of Weakness] : feelings of weakness

## 2024-02-03 LAB
APPEARANCE: CLEAR
BACTERIA: ABNORMAL /HPF
BILIRUBIN URINE: NEGATIVE
BLOOD URINE: ABNORMAL
CAST: 0 /LPF
COLOR: YELLOW
EPITHELIAL CELLS: 9 /HPF
GLUCOSE QUALITATIVE U: NEGATIVE MG/DL
KETONES URINE: NEGATIVE MG/DL
LEUKOCYTE ESTERASE URINE: NEGATIVE
MICROSCOPIC-UA: NORMAL
NITRITE URINE: NEGATIVE
PH URINE: 6.5
PROTEIN URINE: NEGATIVE MG/DL
RED BLOOD CELLS URINE: 4 /HPF
SPECIFIC GRAVITY URINE: 1.02
UROBILINOGEN URINE: 0.2 MG/DL
WHITE BLOOD CELLS URINE: 1 /HPF

## 2024-02-05 LAB — BACTERIA UR CULT: NORMAL

## 2024-02-09 LAB — ESTROGEN SERPL-MCNC: 267 PG/ML

## 2024-02-13 NOTE — ED ADULT TRIAGE NOTE - CHIEF COMPLAINT QUOTE
Physical Therapy   Initial Evaluation     Patient Name: Jean Barboza  Age:  71 y.o., Sex:  male  Medical Record #: [id number]  Today's Date: 2/13/2024     Precautions  Precautions: Fall Risk  Comments: desats with ambulation on 2L, needs 3L with ambulation.    Assessment  Patient is 71 y.o. male with a diagnosis of acute respiratory failure with hypoxia. Pt with past medical history of End-stage renal disease, hypertension, diabetes mellitus type 2, seizure disorder, history of CVA, anemia and bone mineral disease both associated with end-stage renal disease.  He has a recent past medical history significant for COVID 19 infection. briefly, he presents February 11 with several days of worsening fatigue, diarrhea, generalized weakness, shortness of breath. .  Additional factors influencing patient status / progress : today, pt is most limited by fatigue and increased O2 needs during short ambulation. Pt lives with supportive brother who can assist when needed. See details below, PT to follow. .      Plan    Physical Therapy Initial Treatment Plan   Treatment Plan : Bed Mobility, Gait Training, Neuro Re-Education / Balance, Stair Training, Therapeutic Activities  Treatment Frequency: 3 Times per Week  Duration: Until Therapy Goals Met    DC Equipment Recommendations: None  Discharge Recommendations: Anticipate that the patient will have no further physical therapy needs after discharge from the hospital (depending on progress)            Objective       02/13/24 1226   Charge Group   PT Evaluation PT Evaluation High   Total Time Spent   PT Total Time Yes   PT Evaluation Time Spent (Mins) 30   PT Total Time Spent (Calculated) 30   Initial Contact Note    Initial Contact Note Order Received and Verified, Physical Therapy Evaluation in Progress with Full Report to Follow.   Precautions   Precautions Fall Risk   Comments desats with ambulation on 2L, needs 3L with ambulation.   Vitals   Pulse Oximetry (!) 78 %  (briefly  with walking, but back into 90s once seated up in chair)   O2 (LPM) 2   O2 Delivery Device High Flow Nasal Cannula   Vitals Comments pt is found on 2L, tolerating well at rest, but desats to 78% with ambulation on 2L, will need 3L with ambulation.   Pain 0 - 10 Group   Therapist Pain Assessment During Activity;Nurse Notified;0   Prior Living Situation   Prior Services None   Housing / Facility 1 Story House   Steps Into Home 3   Steps In Home 0   Equipment Owned Front-Wheel Walker;4-Wheel Walker   Lives with - Patient's Self Care Capacity Sibling  (brother, can help as needed.)   Comments brother does the driving or takes shuttle bus.   Prior Level of Functional Mobility   Bed Mobility Independent   Transfer Status Independent   Ambulation Independent   Ambulation Distance community   Assistive Devices Used None   Stairs Independent   Cognition    Level of Consciousness Alert   Active ROM Lower Body    Active ROM Lower Body  WDL   Strength Lower Body   Lower Body Strength  WDL   Comments functional for transfers, ambulation but lacks endurance due to desaturation, needs increased O2 with ambulation.   Balance Assessment   Sitting Balance (Static) Good   Sitting Balance (Dynamic) Good   Standing Balance (Static) Fair   Standing Balance (Dynamic) Fair -   Weight Shift Sitting Fair   Weight Shift Standing Fair   Comments with FWW   Bed Mobility    Supine to Sit Standby Assist   Scooting Supervised   Rolling Supervised   Gait Analysis   Gait Level Of Assist Contact Guard Assist   Assistive Device Front Wheel Walker   Distance (Feet) 25   # of Times Distance was Traveled 1   Weight Bearing Status no restrictions   Functional Mobility   Sit to Stand Contact Guard Assist   Bed, Chair, Wheelchair Transfer Contact Guard Assist   Transfer Method Stand Pivot   Comments HOB up and used rail.   How much difficulty does the patient currently have...   Turning over in bed (including adjusting bedclothes, sheets and blankets)? 4    Sitting down on and standing up from a chair with arms (e.g., wheelchair, bedside commode, etc.) 3   Moving from lying on back to sitting on the side of the bed? 4   How much help from another person does the patient currently need...   Moving to and from a bed to a chair (including a wheelchair)? 3   Need to walk in a hospital room? 3   Climbing 3-5 steps with a railing? 3   6 clicks Mobility Score 20   Activity Tolerance   Sitting in Chair 15+   Standing 5   Short Term Goals    Short Term Goal # 1 Pt will perform bed mobility with flat bed, no rail with supervision in 6 visits.   Short Term Goal # 2 Pt will transfer with mod indep in 6 visits to improve functional indep.   Short Term Goal # 3 Pt will ambulate x 125 feet using FWW with supervision in 6 visits to improve functional indep.   Short Term Goal # 4 Pt will negotiate 3 stairs with supervision in 6  visits to access home.   Education Group   Education Provided Role of Physical Therapist   Role of Physical Therapist Patient Response Patient;Acceptance;Explanation;Verbal Demonstration   Additional Comments Ed done re benefits of doing extra laps around room when nsg in to assist to bathroom.   Physical Therapy Initial Treatment Plan    Treatment Plan  Bed Mobility;Gait Training;Neuro Re-Education / Balance;Stair Training;Therapeutic Activities   Treatment Frequency 3 Times per Week   Duration Until Therapy Goals Met   Problem List    Problems Impaired Bed Mobility;Impaired Transfers;Impaired Ambulation;Decreased Activity Tolerance;Impaired Balance   Anticipated Discharge Equipment and Recommendations   DC Equipment Recommendations None   Discharge Recommendations Anticipate that the patient will have no further physical therapy needs after discharge from the hospital  (depending on progress)   Interdisciplinary Plan of Care Collaboration   IDT Collaboration with  Nursing   Patient Position at End of Therapy Seated;Call Light within Reach;Tray Table within  Reach;Phone within Reach   Collaboration Comments nsg updated   Session Information   Date / Session Number  2/13-1 (1/3, 2/19)          Pt complains of chest pain with SOB and left arm pain.  Pt denies fever, cough, sick contacts.  EKG done on arrival.

## 2024-02-22 ENCOUNTER — APPOINTMENT (OUTPATIENT)
Dept: UROLOGY | Facility: CLINIC | Age: 44
End: 2024-02-22
Payer: COMMERCIAL

## 2024-02-22 VITALS
OXYGEN SATURATION: 99 % | RESPIRATION RATE: 14 BRPM | HEART RATE: 73 BPM | SYSTOLIC BLOOD PRESSURE: 101 MMHG | DIASTOLIC BLOOD PRESSURE: 63 MMHG | WEIGHT: 140 LBS | TEMPERATURE: 98.2 F | HEIGHT: 60 IN | BODY MASS INDEX: 27.48 KG/M2

## 2024-02-22 PROCEDURE — 52285 CYSTOSCOPY AND TREATMENT: CPT

## 2024-02-22 PROCEDURE — 99212 OFFICE O/P EST SF 10 MIN: CPT | Mod: 25

## 2024-02-22 NOTE — ADDENDUM
[FreeTextEntry1] : I, Nely Busch, acted as a scribe on behalf of Dr. Rosa 02/22/2024.   All medical record entries made by the Scribe were at my, Dr.Kip Rosa, direction and personally dictated by me on 02/22/2024. I have reviewed the chart and agree that the record accurately reflects my personal performance of the history, physical exam, assessment and plan. I have also personally directed, reviewed, and agreed with the chart.

## 2024-02-22 NOTE — PHYSICAL EXAM
[Normal Appearance] : normal appearance [Well Groomed] : well groomed [Edema] : no peripheral edema [General Appearance - In No Acute Distress] : no acute distress [Respiration, Rhythm And Depth] : normal respiratory rhythm and effort [Exaggerated Use Of Accessory Muscles For Inspiration] : no accessory muscle use [Abdomen Tenderness] : non-tender [Abdomen Soft] : soft [Costovertebral Angle Tenderness] : no ~M costovertebral angle tenderness [Urinary Bladder Findings] : the bladder was normal on palpation [No Focal Deficits] : no focal deficits [Normal Station and Gait] : the gait and station were normal for the patient's age [] : no rash [Oriented To Time, Place, And Person] : oriented to person, place, and time [Affect] : the affect was normal [Mood] : the mood was normal [No Palpable Adenopathy] : no palpable adenopathy [de-identified] : Vaginal tissues appear normal.

## 2024-02-22 NOTE — HISTORY OF PRESENT ILLNESS
[FreeTextEntry1] : Follow-up 02/22/2024: 44 y/o F patient underwent a cystoscopy today with finding of a urethral stenosis. Her results were reviewed with her. She is feeling somewhat better at this point.

## 2024-03-08 ENCOUNTER — APPOINTMENT (OUTPATIENT)
Dept: UROLOGY | Facility: CLINIC | Age: 44
End: 2024-03-08
Payer: COMMERCIAL

## 2024-03-08 VITALS
HEART RATE: 76 BPM | OXYGEN SATURATION: 100 % | WEIGHT: 140 LBS | RESPIRATION RATE: 15 BRPM | DIASTOLIC BLOOD PRESSURE: 67 MMHG | BODY MASS INDEX: 27.48 KG/M2 | HEIGHT: 60 IN | SYSTOLIC BLOOD PRESSURE: 105 MMHG

## 2024-03-08 PROCEDURE — 51784 ANAL/URINARY MUSCLE STUDY: CPT

## 2024-03-08 PROCEDURE — 51729 CYSTOMETROGRAM W/VP&UP: CPT

## 2024-03-08 PROCEDURE — 51741 ELECTRO-UROFLOWMETRY FIRST: CPT

## 2024-03-08 PROCEDURE — 51797 INTRAABDOMINAL PRESSURE TEST: CPT

## 2024-03-08 PROCEDURE — 51798 US URINE CAPACITY MEASURE: CPT

## 2024-03-21 ENCOUNTER — APPOINTMENT (OUTPATIENT)
Dept: UROLOGY | Facility: CLINIC | Age: 44
End: 2024-03-21
Payer: COMMERCIAL

## 2024-03-21 DIAGNOSIS — N34.3 URETHRAL SYNDROME, UNSPECIFIED: ICD-10-CM

## 2024-03-21 DIAGNOSIS — N39.0 URINARY TRACT INFECTION, SITE NOT SPECIFIED: ICD-10-CM

## 2024-03-21 PROCEDURE — 76705 ECHO EXAM OF ABDOMEN: CPT

## 2024-03-21 PROCEDURE — 76857 US EXAM PELVIC LIMITED: CPT

## 2024-04-02 NOTE — ED ADULT TRIAGE NOTE - AS HEIGHT TYPE
Nicolás Torres is a 54 y.o. female   Chief Complaint   Patient presents with    EW         ASSESSMENT AND PLAN:    1. Breast cancer screening by mammogram  CBE WNL today; proceed with screening mammogram.  Return annually for screening unless otherwise indicated.      SUBJECTIVE:    HPI:  Nicolás Torres is a 54 y.o. female  - , postmenopausal (hysterectomy ) who presents to [facility] for breast cancer screening. She denies breast pain, lumps, nipple discharge, nipple inversion and overlying skin changes. No family history of breast, ovarian or colon cancer.  Last mammogram May 2022 was BIRADS 1.    Review of Systems   Constitutional: Negative.    Respiratory: Negative.     Cardiovascular: Negative.    Gastrointestinal: Negative.    Genitourinary:  Negative for dyspareunia, dysuria, genital sores, pelvic pain, vaginal discharge and vaginal pain.     Physical Exam  Constitutional:       General: She is not in acute distress.     Appearance: Normal appearance.   Pulmonary:      Effort: Pulmonary effort is normal.   Chest:   Breasts:     Right: Normal. No inverted nipple, mass, nipple discharge, skin change or tenderness.      Left: Normal. No inverted nipple, mass, nipple discharge, skin change or tenderness.   Lymphadenopathy:      Upper Body:      Right upper body: No supraclavicular, axillary or pectoral adenopathy.      Left upper body: No supraclavicular, axillary or pectoral adenopathy.   Neurological:      Mental Status: She is alert and oriented to person, place, and time.           stated

## 2024-04-11 ENCOUNTER — EMERGENCY (EMERGENCY)
Facility: HOSPITAL | Age: 44
LOS: 0 days | Discharge: HOME | End: 2024-04-11
Attending: EMERGENCY MEDICINE
Payer: COMMERCIAL

## 2024-04-11 VITALS
SYSTOLIC BLOOD PRESSURE: 101 MMHG | WEIGHT: 145.06 LBS | RESPIRATION RATE: 18 BRPM | TEMPERATURE: 98 F | OXYGEN SATURATION: 100 % | DIASTOLIC BLOOD PRESSURE: 56 MMHG | HEART RATE: 74 BPM

## 2024-04-11 VITALS
SYSTOLIC BLOOD PRESSURE: 97 MMHG | TEMPERATURE: 98 F | OXYGEN SATURATION: 98 % | HEART RATE: 60 BPM | DIASTOLIC BLOOD PRESSURE: 54 MMHG | RESPIRATION RATE: 18 BRPM

## 2024-04-11 DIAGNOSIS — M54.50 LOW BACK PAIN, UNSPECIFIED: ICD-10-CM

## 2024-04-11 DIAGNOSIS — Z45.09 ENCOUNTER FOR ADJUSTMENT AND MANAGEMENT OF OTHER CARDIAC DEVICE: Chronic | ICD-10-CM

## 2024-04-11 DIAGNOSIS — Z98.890 OTHER SPECIFIED POSTPROCEDURAL STATES: Chronic | ICD-10-CM

## 2024-04-11 DIAGNOSIS — Z98.51 TUBAL LIGATION STATUS: Chronic | ICD-10-CM

## 2024-04-11 LAB
ALBUMIN SERPL ELPH-MCNC: 3.6 G/DL — SIGNIFICANT CHANGE UP (ref 3.3–5)
ALP SERPL-CCNC: 89 U/L — SIGNIFICANT CHANGE UP (ref 40–120)
ALT FLD-CCNC: 14 U/L — SIGNIFICANT CHANGE UP (ref 12–78)
ANION GAP SERPL CALC-SCNC: 4 MMOL/L — LOW (ref 5–17)
APPEARANCE UR: CLEAR — SIGNIFICANT CHANGE UP
AST SERPL-CCNC: 14 U/L — LOW (ref 15–37)
BACTERIA # UR AUTO: ABNORMAL /HPF
BASOPHILS # BLD AUTO: 0.05 K/UL — SIGNIFICANT CHANGE UP (ref 0–0.2)
BASOPHILS NFR BLD AUTO: 0.4 % — SIGNIFICANT CHANGE UP (ref 0–2)
BILIRUB SERPL-MCNC: 0.2 MG/DL — SIGNIFICANT CHANGE UP (ref 0.2–1.2)
BILIRUB UR-MCNC: NEGATIVE — SIGNIFICANT CHANGE UP
BUN SERPL-MCNC: 17 MG/DL — SIGNIFICANT CHANGE UP (ref 7–23)
CALCIUM SERPL-MCNC: 9.2 MG/DL — SIGNIFICANT CHANGE UP (ref 8.5–10.1)
CAST: 0 /LPF — SIGNIFICANT CHANGE UP (ref 0–4)
CHLORIDE SERPL-SCNC: 107 MMOL/L — SIGNIFICANT CHANGE UP (ref 96–108)
CO2 SERPL-SCNC: 28 MMOL/L — SIGNIFICANT CHANGE UP (ref 22–31)
COLOR SPEC: YELLOW — SIGNIFICANT CHANGE UP
CREAT SERPL-MCNC: 1.03 MG/DL — SIGNIFICANT CHANGE UP (ref 0.5–1.3)
DIFF PNL FLD: ABNORMAL
EGFR: 68 ML/MIN/1.73M2 — SIGNIFICANT CHANGE UP
EOSINOPHIL # BLD AUTO: 0.07 K/UL — SIGNIFICANT CHANGE UP (ref 0–0.5)
EOSINOPHIL NFR BLD AUTO: 0.6 % — SIGNIFICANT CHANGE UP (ref 0–6)
GLUCOSE SERPL-MCNC: 110 MG/DL — HIGH (ref 70–99)
GLUCOSE UR QL: NEGATIVE MG/DL — SIGNIFICANT CHANGE UP
HCG SERPL-ACNC: <1 MIU/ML — SIGNIFICANT CHANGE UP
HCT VFR BLD CALC: 37.1 % — SIGNIFICANT CHANGE UP (ref 34.5–45)
HGB BLD-MCNC: 12.3 G/DL — SIGNIFICANT CHANGE UP (ref 11.5–15.5)
IMM GRANULOCYTES NFR BLD AUTO: 0.3 % — SIGNIFICANT CHANGE UP (ref 0–0.9)
KETONES UR-MCNC: NEGATIVE MG/DL — SIGNIFICANT CHANGE UP
LEUKOCYTE ESTERASE UR-ACNC: NEGATIVE — SIGNIFICANT CHANGE UP
LIDOCAIN IGE QN: 58 U/L — SIGNIFICANT CHANGE UP (ref 13–75)
LYMPHOCYTES # BLD AUTO: 1.95 K/UL — SIGNIFICANT CHANGE UP (ref 1–3.3)
LYMPHOCYTES # BLD AUTO: 16.6 % — SIGNIFICANT CHANGE UP (ref 13–44)
MCHC RBC-ENTMCNC: 30.7 PG — SIGNIFICANT CHANGE UP (ref 27–34)
MCHC RBC-ENTMCNC: 33.2 GM/DL — SIGNIFICANT CHANGE UP (ref 32–36)
MCV RBC AUTO: 92.5 FL — SIGNIFICANT CHANGE UP (ref 80–100)
MONOCYTES # BLD AUTO: 0.8 K/UL — SIGNIFICANT CHANGE UP (ref 0–0.9)
MONOCYTES NFR BLD AUTO: 6.8 % — SIGNIFICANT CHANGE UP (ref 2–14)
NEUTROPHILS # BLD AUTO: 8.81 K/UL — HIGH (ref 1.8–7.4)
NEUTROPHILS NFR BLD AUTO: 75.3 % — SIGNIFICANT CHANGE UP (ref 43–77)
NITRITE UR-MCNC: NEGATIVE — SIGNIFICANT CHANGE UP
PH UR: 6 — SIGNIFICANT CHANGE UP (ref 5–8)
PLATELET # BLD AUTO: 329 K/UL — SIGNIFICANT CHANGE UP (ref 150–400)
POTASSIUM SERPL-MCNC: 3.9 MMOL/L — SIGNIFICANT CHANGE UP (ref 3.5–5.3)
POTASSIUM SERPL-SCNC: 3.9 MMOL/L — SIGNIFICANT CHANGE UP (ref 3.5–5.3)
PROT SERPL-MCNC: 7.6 GM/DL — SIGNIFICANT CHANGE UP (ref 6–8.3)
PROT UR-MCNC: NEGATIVE MG/DL — SIGNIFICANT CHANGE UP
RBC # BLD: 4.01 M/UL — SIGNIFICANT CHANGE UP (ref 3.8–5.2)
RBC # FLD: 12.4 % — SIGNIFICANT CHANGE UP (ref 10.3–14.5)
RBC CASTS # UR COMP ASSIST: 25 /HPF — HIGH (ref 0–4)
SODIUM SERPL-SCNC: 139 MMOL/L — SIGNIFICANT CHANGE UP (ref 135–145)
SP GR SPEC: 1.02 — SIGNIFICANT CHANGE UP (ref 1–1.03)
SQUAMOUS # UR AUTO: 6 /HPF — HIGH (ref 0–5)
UROBILINOGEN FLD QL: 1 MG/DL — SIGNIFICANT CHANGE UP (ref 0.2–1)
WBC # BLD: 11.72 K/UL — HIGH (ref 3.8–10.5)
WBC # FLD AUTO: 11.72 K/UL — HIGH (ref 3.8–10.5)
WBC UR QL: 2 /HPF — SIGNIFICANT CHANGE UP (ref 0–5)

## 2024-04-11 PROCEDURE — 84702 CHORIONIC GONADOTROPIN TEST: CPT

## 2024-04-11 PROCEDURE — 99285 EMERGENCY DEPT VISIT HI MDM: CPT

## 2024-04-11 PROCEDURE — 81001 URINALYSIS AUTO W/SCOPE: CPT

## 2024-04-11 PROCEDURE — 83690 ASSAY OF LIPASE: CPT

## 2024-04-11 PROCEDURE — 74176 CT ABD & PELVIS W/O CONTRAST: CPT | Mod: MC

## 2024-04-11 PROCEDURE — 99284 EMERGENCY DEPT VISIT MOD MDM: CPT | Mod: 25

## 2024-04-11 PROCEDURE — 74176 CT ABD & PELVIS W/O CONTRAST: CPT | Mod: 26,MC

## 2024-04-11 PROCEDURE — 87086 URINE CULTURE/COLONY COUNT: CPT

## 2024-04-11 PROCEDURE — 85025 COMPLETE CBC W/AUTO DIFF WBC: CPT

## 2024-04-11 PROCEDURE — 36415 COLL VENOUS BLD VENIPUNCTURE: CPT

## 2024-04-11 PROCEDURE — 96375 TX/PRO/DX INJ NEW DRUG ADDON: CPT

## 2024-04-11 PROCEDURE — 96374 THER/PROPH/DIAG INJ IV PUSH: CPT

## 2024-04-11 PROCEDURE — 80053 COMPREHEN METABOLIC PANEL: CPT

## 2024-04-11 RX ORDER — AMLODIPINE BESYLATE 2.5 MG/1
5 TABLET ORAL ONCE
Refills: 0 | Status: DISCONTINUED | OUTPATIENT
Start: 2024-04-11 | End: 2024-04-11

## 2024-04-11 RX ORDER — CYCLOBENZAPRINE HYDROCHLORIDE 10 MG/1
1 TABLET, FILM COATED ORAL
Qty: 12 | Refills: 0
Start: 2024-04-11 | End: 2024-04-14

## 2024-04-11 RX ORDER — ONDANSETRON 8 MG/1
4 TABLET, FILM COATED ORAL ONCE
Refills: 0 | Status: COMPLETED | OUTPATIENT
Start: 2024-04-11 | End: 2024-04-11

## 2024-04-11 RX ORDER — MORPHINE SULFATE 50 MG/1
4 CAPSULE, EXTENDED RELEASE ORAL ONCE
Refills: 0 | Status: DISCONTINUED | OUTPATIENT
Start: 2024-04-11 | End: 2024-04-11

## 2024-04-11 RX ORDER — KETOROLAC TROMETHAMINE 30 MG/ML
30 SYRINGE (ML) INJECTION ONCE
Refills: 0 | Status: DISCONTINUED | OUTPATIENT
Start: 2024-04-11 | End: 2024-04-11

## 2024-04-11 RX ORDER — ACETAMINOPHEN 500 MG
1000 TABLET ORAL ONCE
Refills: 0 | Status: COMPLETED | OUTPATIENT
Start: 2024-04-11 | End: 2024-04-11

## 2024-04-11 RX ORDER — SODIUM CHLORIDE 9 MG/ML
1000 INJECTION INTRAMUSCULAR; INTRAVENOUS; SUBCUTANEOUS ONCE
Refills: 0 | Status: COMPLETED | OUTPATIENT
Start: 2024-04-11 | End: 2024-04-11

## 2024-04-11 RX ADMIN — Medication 400 MILLIGRAM(S): at 19:56

## 2024-04-11 RX ADMIN — ONDANSETRON 4 MILLIGRAM(S): 8 TABLET, FILM COATED ORAL at 19:56

## 2024-04-11 RX ADMIN — MORPHINE SULFATE 4 MILLIGRAM(S): 50 CAPSULE, EXTENDED RELEASE ORAL at 19:56

## 2024-04-11 RX ADMIN — SODIUM CHLORIDE 1000 MILLILITER(S): 9 INJECTION INTRAMUSCULAR; INTRAVENOUS; SUBCUTANEOUS at 19:56

## 2024-04-11 RX ADMIN — Medication 30 MILLIGRAM(S): at 20:30

## 2024-04-11 NOTE — ED ADULT TRIAGE NOTE - CHIEF COMPLAINT QUOTE
pt presented to er c/o bilateral flank pain x1 day. pt endorsing nausea and burning on urination. denies v/d, fevers. no meds prior to arrival. NKA.

## 2024-04-11 NOTE — ED STATDOCS - PATIENT PORTAL LINK FT
You can access the FollowMyHealth Patient Portal offered by [facility] by registering at the following website: [email or web address]. By joining [employer]’s FollowMyHealth portal, you will also be able to view your health information using other applications (apps) compatible with our system.

## 2024-04-11 NOTE — ED STATDOCS - PROGRESS NOTE DETAILS
Labs within normal limits, CT abdomen pelvis with no acute findings, 1 cm nonobstructing left-sided intrarenal stone noted, patient feeling better after medications, likely musculoskeletal pain.  Will discharge home with Medrol Dosepak, muscle relaxers, outpatient follow-up with orthospine.  Results and plan given to patient using her daughter as , patient offered translation services, but opted for her daughter to translate.  Patient is agreeable to discharge and plan of care, able to ambulate with steady gait, return precautions given.  Martha Trejo PA-C Patient seen and evaluated, ED attending note and orders reviewed, will continue with patient follow up and care -Martha Trejo PA-C

## 2024-04-11 NOTE — ED STATDOCS - OBJECTIVE STATEMENT
44 y/o female with no pertinent PMHx presents to the ED c/o b/l lower back pain radiating to b/l legs since today. States 4 months ago had dysuria, went to kidney MD and was told everything was okay. Took pain medication for back yesterday with no improvement. Endorses mild abdominal pain, nausea, chills, dysuria. Denies diarrhea, vomiting, fever. Denies heavy lifting, history of UTI. : [id number].

## 2024-04-11 NOTE — ED STATDOCS - CARE PROVIDER_API CALL
Jeffrey Blake  Orthopaedic Surgery  [facility], [address] [address]  Phone: [phone]  Fax: [phone]  Follow Up Time:

## 2024-04-11 NOTE — ED STATDOCS - PHYSICAL EXAMINATION
Constitutional: NAD AOx3  Eyes: PERRL EOMI  Head: Normocephalic atraumatic  Mouth: MMM  Cardiac: regular rate and rhythm  Resp: Lungs CTAB  GI: Abd s/nd/nt   Neuro: CN2-12 grossly intact, EDWARD x 4  Skin: No visible rashes  Spine: b/l lumbar paraspinal TTP

## 2024-04-11 NOTE — ED STATDOCS - NSFOLLOWUPINSTRUCTIONS_ED_ALL_ED_FT
Dolor de espalda indu en los adultos  Acute Back Pain, Adult  El dolor de espalda indu es repentino y por lo general no dura mucho tiempo. Se debe generalmente a david lesión de los músculos y tejidos de la espalda. La lesión puede ser el resultado de:  Estiramiento en exceso o desgarro de un músculo, tendón o ligamento. Los ligamentos son tejidos que conectan los huesos. Levantar algo de forma incorrecta puede producir un esguince de espalda.  Desgaste (degeneración) de los discos vertebrales. Los discos vertebrales son tejidos circulares que proporcionan amortiguación entre los huesos de la columna vertebral (vértebras).  Movimientos de giro, kalpana al practicar deportes o realizar trabajos de jardinería.  Un golpe en la espalda.  Artritis.  Es posible que le realicen un examen físico, análisis de laboratorio u otros estudios de diagnóstico por imágenes para encontrar la causa del dolor. El dolor de espalda indu generalmente desaparece con reposo y cuidados en la casa.    Siga estas instrucciones en lopez casa:  Control del dolor, la rigidez y la hinchazón    Use los medicamentos de venta vega y los recetados solamente kalpana se lo haya indicado el médico. El tratamiento puede incluir medicamentos para el dolor y la inflamación que se teresa por la boca o que se aplican sobre la piel, o relajantes musculares.  El médico puede recomendarle que se aplique hielo mack las primeras 24 a 48 horas después del comienzo del dolor. Para hacer esto:  Ponga el hielo en david bolsa plástica.  Coloque david toalla entre la piel y la bolsa.  Aplique el hielo mack 20 minutos, 2 o 3 veces por día.  Retire el hielo si la piel se pone de color king brillante. [place] es muy importante. Si no puede sentir dolor, calor o frío, tiene un mayor riesgo de que se dañe la wanda.  Si se lo indican, aplique calor en la wanda afectada con la frecuencia que le haya indicado el médico. Use la carroll de calor que el médico le recomiende, kalpana david compresa de calor húmedo o david almohadilla térmica.  Coloque david toalla entre la piel y la carroll de calor.  Aplique calor mack 20 a 30 minutos.  Retire la carroll de calor si la piel se pone de color king brillante. [place] es especialmente importante si no puede sentir dolor, calor o frío. Corre un mayor riesgo de sufrir quemaduras.  Actividad    Comparisons of good and bad posture while driving, standing, sitting at a desk, and lifting heavy objects.  No permanezca en la cama. Hacer reposo en la cama por más de 1 a 2 días puede demorar lopez recuperación.  Mantenga david buena postura al sentarse y pararse. No se incline hacia adelante al sentarse ni se encorve al pararse.  Si trabaja en un escritorio, siéntese cerca de parth para no tener que inclinarse. Mantenga el mentón hacia abajo. Mantenga el waylon hacia atrás y los codos flexionados en un ángulo de 90 grados (ángulo recto).  Cuando conduzca, siéntese elevado y cerca del volante. Agregue un apoyo para la espalda (lumbar) al asiento del automóvil, si es necesario.  Realice caminatas cortas en superficies marcos tan pronto kalpana le sea posible. Trate de caminar un poco más de tiempo cada día.  No se siente, conduzca o permanezca de pie en un mismo lugar mack más de 30 minutos seguidos. Pararse o sentarse mack largos períodos de tiempo puede sobrecargar la espalda.  No conduzca ni use maquinaria pesada mientras francy analgésicos recetados.  Use técnicas apropiadas para levantar objetos. Cuando se inclina y levanta un objeto, utilice posiciones que no sobrecarguen tanto la espalda:  Flexione las rodillas.  Mantenga la carga cerca del cuerpo.  No se tuerza.  Ailyn actividad física habitualmente kalpana se lo haya indicado el médico. Hacer ejercicios ayuda a que la espalda sane más rápido y ayuda a evitar las lesiones de la espalda al mantener los músculos ehsan y flexibles.  Trabaje con un fisioterapeuta para crear un programa de ejercicios seguros, según lo recomiende el médico. Ailyn ejercicios kalpana se lo haya indicado el fisioterapeuta.  Estilo de roshan    Mantenga un peso saludable. El sobrepeso sobrecarga la espalda y hace que resulte difícil tener david buena postura.  Evite actividades o situaciones que lo missy sentirse ansioso o estresado. El estrés y la ansiedad aumentan la tensión muscular y pueden empeorar el dolor de espalda. Aprenda formas de manejar la ansiedad y el estrés, kalpana a través del ejercicio.  Instrucciones generales    Duerma sobre un colchón firme en david posición cómoda. Intente acostarse de costado, con las rodillas ligeramente flexionadas. Si se recuesta sobre la espalda, coloque david almohada debajo de las rodillas.  Mantenga la britt y el waylon en línea recta con la columna vertebral (posición neutra) cuando use equipos electrónicos kalpana teléfonos inteligentes o tablets. Para hacer esto:  Levante el teléfono inteligente o la tablet para mirarlo en lugar de inclinar la britt o el waylon para mirar hacia abajo.  Coloque el teléfono inteligente o la tablet al nivel de lopez diogenes mientras ermias la pantalla.  Siga el plan de tratamiento kalpana se lo haya indicado el médico. [place] puede incluir:  Terapia cognitiva o conductual.  Acupuntura o terapia de masajes.  Yoga o meditación.  Comuníquese con un médico si:  Siente un dolor que no se armin con reposo o medicamentos.  Siente mucho dolor que se extiende a las piernas o las nalgas.  El dolor no mejora luego de 2 semanas.  Siente dolor por la noche.  Pierde peso sin proponérselo.  Tiene fiebre o escalofríos.  Siente náuseas o vómitos.  Siente dolor abdominal.  Solicite ayuda de inmediato si:  Tiene nuevos problemas para controlar la vejiga o los intestinos.  Siente debilidad o adormecimiento inusuales en los brazos o en las piernas.  Siente que va a desmayarse.  Estos síntomas pueden representar un problema grave que constituye david emergencia. No espere a anthony si los síntomas desaparecen. Solicite atención médica de inmediato. Comuníquese con el servicio de emergencias de lopez localidad (911 en los [place]). No conduzca por james propios medios hasta el hospital.    Resumen  El dolor de espalda indu es repentino y por lo general no dura mucho tiempo.  Use técnicas apropiadas para levantar objetos. Cuando se inclina y levanta un objeto, utilice posiciones que no sobrecarguen tanto la espalda.  [place] los medicamentos de venta vega y los recetados solamente kalpana se lo haya indicado el médico, y aplíquese calor o hielo según las indicaciones.  Esta información no tiene kalpana fin reemplazar el consejo del médico. Asegúrese de hacerle al médico cualquier pregunta que tenga.

## 2024-04-13 LAB
CULTURE RESULTS: SIGNIFICANT CHANGE UP
SPECIMEN SOURCE: SIGNIFICANT CHANGE UP

## 2024-04-16 ENCOUNTER — APPOINTMENT (OUTPATIENT)
Dept: ORTHOPEDIC SURGERY | Facility: CLINIC | Age: 44
End: 2024-04-16
Payer: COMMERCIAL

## 2024-04-16 VITALS
SYSTOLIC BLOOD PRESSURE: 112 MMHG | DIASTOLIC BLOOD PRESSURE: 71 MMHG | WEIGHT: 145 LBS | HEIGHT: 61 IN | BODY MASS INDEX: 27.38 KG/M2 | HEART RATE: 79 BPM

## 2024-04-16 DIAGNOSIS — M67.952 UNSPECIFIED DISORDER OF SYNOVIUM AND TENDON, LEFT THIGH: ICD-10-CM

## 2024-04-16 DIAGNOSIS — M67.951 UNSPECIFIED DISORDER OF SYNOVIUM AND TENDON, RIGHT THIGH: ICD-10-CM

## 2024-04-16 DIAGNOSIS — M47.816 SPONDYLOSIS W/OUT MYELOPATHY OR RADICULOPATHY, LUMBAR REGION: ICD-10-CM

## 2024-04-16 PROCEDURE — 99204 OFFICE O/P NEW MOD 45 MIN: CPT

## 2024-04-16 PROCEDURE — 72100 X-RAY EXAM L-S SPINE 2/3 VWS: CPT

## 2024-04-16 RX ORDER — MELOXICAM 15 MG/1
15 TABLET ORAL
Qty: 30 | Refills: 1 | Status: ACTIVE | COMMUNITY
Start: 2024-04-16 | End: 1900-01-01

## 2024-04-16 NOTE — HISTORY OF PRESENT ILLNESS
[de-identified] : 43-year-old female is here for low back pain myositis in nature has been going on approximately 3 years getting worse through time.  She has had no physical therapy chiropractic or pain management.  Is worse with standing or sitting in 1 position for long period of time better with walking and changing position.  She has no change in bowel or bladder.  No tingling numbness or burning down the legs.  She has been taking a Medrol Dosepak from primary care provider which she states helped significantly in relieving her pain.  Also taking cyclobenzaprine at night as needed for muscle spasm which is also helping somewhat to relieve her pain.  Not appreciating weakness of the legs.  She is able to accomplish her activities of light daily living.  Past medical surgical social family allergy history is reviewed.

## 2024-04-16 NOTE — DISCUSSION/SUMMARY
[Medication Risks Reviewed] : Medication risks reviewed [6 Weeks] : in 6 weeks [de-identified] : 30 minutes was spent reviewing the x-rays as well as discussing with the patient their clinical presentation, diagnosis and providing education.  Conservative treatment was discussed with the patient at length. Anticipatory guidance regarding disease process, avoidance of acute exacerbation this was discussed at length and all patients commenting concerns were answered to the patient's satisfaction. Physical therapy for decrease pain and increase function was ordered. Patient was given home exercises as approved by [employer] and works well held directed toward this particular process. Intermittent use of acetaminophen 500 mg 2 tablets t.i.d. p.r.n. mild to moderate pain, when she is done with the Medrol Dosepak prescribed by her primary care provider she can start meloxicam 50 mg once daily as needed for pain and inflammation.  Cyclobenzaprine 10 mg p.o. at bedtime only as needed for muscle spasm and we did discuss that it can make her drowsy.  She can get refills of meloxicam and cyclobenzaprine from her primary care provider if they are working well to decrease pain.. Home exercise including stretching on a daily basis for 20-30 minutes was recommended. Heat, ice, topical were discussed as needed. The patient will followup in 6-8 weeks at which point in time if symptoms continue we will order lumbar MRI studies to guide treatment plan including possible injection therapy with pain management versus surgical option.

## 2024-04-16 NOTE — PHYSICAL EXAM
[de-identified] : CONSTITUTIONAL: The patient is a very pleasant individual who is well-nourished and who appears stated age. PSYCHIATRIC: The patient is alert and oriented X 3 and in no apparent distress, and participates with orthopedic evaluation well. HEAD: Atraumatic and is nonsyndromic in appearance. EENT: No visible thyromegaly, EOMI. RESPIRATORY: Respiratory rate is regular, not dyspneic on examination. LYMPHATICS: There is no inguinal lymphadenopathy INTEGUMENTARY: Skin is clean, dry, and intact about the bilateral lower extremities and lumbar spine. VASCULAR: There is brisk capillary refill about the bilateral lower extremities. NEUROLOGIC: There are no pathologic reflexes. There is no decrease in sensation of the bilateral lower extremities on manual examination. Deep tendon reflexes are well maintained at 2+/4 of the bilateral lower extremities and are symmetric.. MUSCULOSKELETAL: There is no visible muscular atrophy. Manual motor strength is well maintained in the bilateral lower extremities. Range of motion of lumbar spine is well maintained. The patient ambulates in a non-myelopathic manner. Negative tension sign and straight leg raise bilaterally. Quad extension, ankle dorsiflexion, EHL, plantar flexion, and ankle eversion are well preserved. Normal secondary orthopaedic exam of bilateral hips, greater trochanteric area, knees and ankles exam as highlighted by mechanical low back pain on forward flexion as well as reproducible lower lumbar myositis gluteus tendinopathy type findings. [de-identified] : AP lateral x-ray of the lumbar spine done today in the office on the date of April 16, 2024 demonstrates a well-preserved lumbar lordosis very minor spondylosis L5-S1

## 2024-05-01 ENCOUNTER — EMERGENCY (EMERGENCY)
Facility: HOSPITAL | Age: 44
LOS: 0 days | Discharge: HOME | End: 2024-05-01
Attending: EMERGENCY MEDICINE
Payer: COMMERCIAL

## 2024-05-01 VITALS
TEMPERATURE: 98 F | OXYGEN SATURATION: 98 % | SYSTOLIC BLOOD PRESSURE: 102 MMHG | HEART RATE: 64 BPM | DIASTOLIC BLOOD PRESSURE: 61 MMHG | RESPIRATION RATE: 17 BRPM

## 2024-05-01 VITALS
DIASTOLIC BLOOD PRESSURE: 80 MMHG | RESPIRATION RATE: 18 BRPM | OXYGEN SATURATION: 100 % | SYSTOLIC BLOOD PRESSURE: 109 MMHG | HEART RATE: 80 BPM | WEIGHT: 143.74 LBS | TEMPERATURE: 99 F

## 2024-05-01 DIAGNOSIS — Z98.51 TUBAL LIGATION STATUS: Chronic | ICD-10-CM

## 2024-05-01 DIAGNOSIS — N93.9 ABNORMAL UTERINE AND VAGINAL BLEEDING, UNSPECIFIED: ICD-10-CM

## 2024-05-01 DIAGNOSIS — Z45.09 ENCOUNTER FOR ADJUSTMENT AND MANAGEMENT OF OTHER CARDIAC DEVICE: Chronic | ICD-10-CM

## 2024-05-01 DIAGNOSIS — N80.03 ADENOMYOSIS OF THE UTERUS: ICD-10-CM

## 2024-05-01 DIAGNOSIS — M54.9 DORSALGIA, UNSPECIFIED: ICD-10-CM

## 2024-05-01 DIAGNOSIS — Z98.890 OTHER SPECIFIED POSTPROCEDURAL STATES: Chronic | ICD-10-CM

## 2024-05-01 LAB
ANION GAP SERPL CALC-SCNC: 4 MMOL/L — LOW (ref 5–17)
APPEARANCE UR: ABNORMAL
BACTERIA # UR AUTO: ABNORMAL /HPF
BASOPHILS # BLD AUTO: 0.04 K/UL — SIGNIFICANT CHANGE UP (ref 0–0.2)
BASOPHILS NFR BLD AUTO: 0.6 % — SIGNIFICANT CHANGE UP (ref 0–2)
BILIRUB UR-MCNC: NEGATIVE — SIGNIFICANT CHANGE UP
BUN SERPL-MCNC: 8 MG/DL — SIGNIFICANT CHANGE UP (ref 7–23)
CALCIUM SERPL-MCNC: 9.1 MG/DL — SIGNIFICANT CHANGE UP (ref 8.5–10.1)
CHLORIDE SERPL-SCNC: 109 MMOL/L — HIGH (ref 96–108)
CO2 SERPL-SCNC: 26 MMOL/L — SIGNIFICANT CHANGE UP (ref 22–31)
COLOR SPEC: ABNORMAL
CREAT SERPL-MCNC: 0.82 MG/DL — SIGNIFICANT CHANGE UP (ref 0.5–1.3)
DIFF PNL FLD: ABNORMAL
EGFR: 90 ML/MIN/1.73M2 — SIGNIFICANT CHANGE UP
EOSINOPHIL # BLD AUTO: 0.1 K/UL — SIGNIFICANT CHANGE UP (ref 0–0.5)
EOSINOPHIL NFR BLD AUTO: 1.4 % — SIGNIFICANT CHANGE UP (ref 0–6)
EPI CELLS # UR: PRESENT
GLUCOSE SERPL-MCNC: 105 MG/DL — HIGH (ref 70–99)
GLUCOSE UR QL: NEGATIVE MG/DL — SIGNIFICANT CHANGE UP
HCT VFR BLD CALC: 38 % — SIGNIFICANT CHANGE UP (ref 34.5–45)
HGB BLD-MCNC: 12.3 G/DL — SIGNIFICANT CHANGE UP (ref 11.5–15.5)
HYALINE CASTS # UR AUTO: SIGNIFICANT CHANGE UP
IMM GRANULOCYTES NFR BLD AUTO: 0.1 % — SIGNIFICANT CHANGE UP (ref 0–0.9)
KETONES UR-MCNC: NEGATIVE MG/DL — SIGNIFICANT CHANGE UP
LEUKOCYTE ESTERASE UR-ACNC: NEGATIVE — SIGNIFICANT CHANGE UP
LYMPHOCYTES # BLD AUTO: 1.82 K/UL — SIGNIFICANT CHANGE UP (ref 1–3.3)
LYMPHOCYTES # BLD AUTO: 26.3 % — SIGNIFICANT CHANGE UP (ref 13–44)
MCHC RBC-ENTMCNC: 30.4 PG — SIGNIFICANT CHANGE UP (ref 27–34)
MCHC RBC-ENTMCNC: 32.4 GM/DL — SIGNIFICANT CHANGE UP (ref 32–36)
MCV RBC AUTO: 93.8 FL — SIGNIFICANT CHANGE UP (ref 80–100)
MONOCYTES # BLD AUTO: 0.44 K/UL — SIGNIFICANT CHANGE UP (ref 0–0.9)
MONOCYTES NFR BLD AUTO: 6.4 % — SIGNIFICANT CHANGE UP (ref 2–14)
NEUTROPHILS # BLD AUTO: 4.5 K/UL — SIGNIFICANT CHANGE UP (ref 1.8–7.4)
NEUTROPHILS NFR BLD AUTO: 65.2 % — SIGNIFICANT CHANGE UP (ref 43–77)
NITRITE UR-MCNC: NEGATIVE — SIGNIFICANT CHANGE UP
PH UR: 7 — SIGNIFICANT CHANGE UP (ref 5–8)
PLATELET # BLD AUTO: 346 K/UL — SIGNIFICANT CHANGE UP (ref 150–400)
POCT URINE PREGNANCY TEST: NEGATIVE — SIGNIFICANT CHANGE UP
POTASSIUM SERPL-MCNC: 3.8 MMOL/L — SIGNIFICANT CHANGE UP (ref 3.5–5.3)
POTASSIUM SERPL-SCNC: 3.8 MMOL/L — SIGNIFICANT CHANGE UP (ref 3.5–5.3)
PROT UR-MCNC: 30 MG/DL
RBC # BLD: 4.05 M/UL — SIGNIFICANT CHANGE UP (ref 3.8–5.2)
RBC # FLD: 12.4 % — SIGNIFICANT CHANGE UP (ref 10.3–14.5)
RBC CASTS # UR COMP ASSIST: >50 /HPF — SIGNIFICANT CHANGE UP (ref 0–4)
SODIUM SERPL-SCNC: 139 MMOL/L — SIGNIFICANT CHANGE UP (ref 135–145)
SP GR SPEC: <1.005 — LOW (ref 1–1.03)
UROBILINOGEN FLD QL: NORMAL MG/DL — SIGNIFICANT CHANGE UP (ref 0.2–1)
WBC # BLD: 6.91 K/UL — SIGNIFICANT CHANGE UP (ref 3.8–10.5)
WBC # FLD AUTO: 6.91 K/UL — SIGNIFICANT CHANGE UP (ref 3.8–10.5)
WBC UR QL: 0 /HPF — SIGNIFICANT CHANGE UP (ref 0–5)

## 2024-05-01 PROCEDURE — 76770 US EXAM ABDO BACK WALL COMP: CPT | Mod: 26

## 2024-05-01 PROCEDURE — 76770 US EXAM ABDO BACK WALL COMP: CPT

## 2024-05-01 PROCEDURE — 93010 ELECTROCARDIOGRAM REPORT: CPT

## 2024-05-01 PROCEDURE — 76856 US EXAM PELVIC COMPLETE: CPT | Mod: 26

## 2024-05-01 PROCEDURE — 81001 URINALYSIS AUTO W/SCOPE: CPT

## 2024-05-01 PROCEDURE — 99285 EMERGENCY DEPT VISIT HI MDM: CPT

## 2024-05-01 PROCEDURE — 96375 TX/PRO/DX INJ NEW DRUG ADDON: CPT

## 2024-05-01 PROCEDURE — 76856 US EXAM PELVIC COMPLETE: CPT

## 2024-05-01 PROCEDURE — 76830 TRANSVAGINAL US NON-OB: CPT | Mod: 26

## 2024-05-01 PROCEDURE — 96374 THER/PROPH/DIAG INJ IV PUSH: CPT

## 2024-05-01 PROCEDURE — 99285 EMERGENCY DEPT VISIT HI MDM: CPT | Mod: 25

## 2024-05-01 PROCEDURE — 80048 BASIC METABOLIC PNL TOTAL CA: CPT

## 2024-05-01 PROCEDURE — 87086 URINE CULTURE/COLONY COUNT: CPT

## 2024-05-01 PROCEDURE — 76830 TRANSVAGINAL US NON-OB: CPT

## 2024-05-01 PROCEDURE — 81025 URINE PREGNANCY TEST: CPT

## 2024-05-01 PROCEDURE — 85025 COMPLETE CBC W/AUTO DIFF WBC: CPT

## 2024-05-01 PROCEDURE — 93005 ELECTROCARDIOGRAM TRACING: CPT

## 2024-05-01 PROCEDURE — 36415 COLL VENOUS BLD VENIPUNCTURE: CPT

## 2024-05-01 RX ORDER — KETOROLAC TROMETHAMINE 30 MG/ML
15 SYRINGE (ML) INJECTION ONCE
Refills: 0 | Status: DISCONTINUED | OUTPATIENT
Start: 2024-05-01 | End: 2024-05-01

## 2024-05-01 RX ORDER — ACETAMINOPHEN 500 MG
1000 TABLET ORAL ONCE
Refills: 0 | Status: COMPLETED | OUTPATIENT
Start: 2024-05-01 | End: 2024-05-01

## 2024-05-01 RX ORDER — SODIUM CHLORIDE 9 MG/ML
1000 INJECTION INTRAMUSCULAR; INTRAVENOUS; SUBCUTANEOUS ONCE
Refills: 0 | Status: COMPLETED | OUTPATIENT
Start: 2024-05-01 | End: 2024-05-01

## 2024-05-01 RX ADMIN — Medication 15 MILLIGRAM(S): at 13:26

## 2024-05-01 RX ADMIN — Medication 400 MILLIGRAM(S): at 13:26

## 2024-05-01 RX ADMIN — SODIUM CHLORIDE 2000 MILLILITER(S): 9 INJECTION INTRAMUSCULAR; INTRAVENOUS; SUBCUTANEOUS at 13:27

## 2024-05-01 NOTE — ED ADULT TRIAGE NOTE - CHIEF COMPLAINT QUOTE
Patient presents to the ER with complaints of back pain, bilateral feet swelling, numbness and tingling to feet for two weeks, vaginal bleeding for 16 days, dizziness, shortness of breath. Patient reports her OB stated she is Pre- Menopausal.

## 2024-05-01 NOTE — ED STATDOCS - PHYSICAL EXAMINATION
CONSTITUTIONAL: Well appearing, awake, alert, oriented to person, place, time/situation and in no apparent distress.  · ENMT: Airway patent, Nasal mucosa clear. Mouth with normal mucosa. Throat has no vesicles, no oropharyngeal exudates and uvula is midline.  · EYES: Clear bilaterally, pupils equal, round and reactive to light.  · CARDIAC: Normal rate, regular rhythm.  Heart sounds S1, S2.  No murmurs, rubs or gallops.  · RESPIRATORY: Breath sounds clear and equal bilaterally.  · GASTROINTESTINAL: Abdomen soft, non-tender, no guarding.  · MUSCULOSKELETAL: Spine appears normal, range of motion is not limited, no muscle or joint tenderness  · NEUROLOGICAL: Alert and oriented, no focal deficits, no motor or sensory deficits.  · SKIN: Skin normal color for race, warm, dry and intact. No evidence of rash CONSTITUTIONAL: Well appearing, awake, alert, oriented to person, place, time/situation and in no apparent distress.  · ENMT: Airway patent, Nasal mucosa clear. Mouth with normal mucosa. Throat has no vesicles, no oropharyngeal exudates and uvula is midline.  · EYES: Clear bilaterally, pupils equal, round and reactive to light.  · CARDIAC: Normal rate, regular rhythm.  Heart sounds S1, S2.  No murmurs, rubs or gallops.  · RESPIRATORY: Breath sounds clear and equal bilaterally.  · GASTROINTESTINAL: Abdomen soft, no guarding. Suprapubic tenderness.   · MUSCULOSKELETAL: Spine appears normal, range of motion is not limited, TTP right lumbar paraspinal muscles.  · NEUROLOGICAL: Alert and oriented, no focal deficits, no motor or sensory deficits.  · SKIN: Skin normal color for race, warm, dry and intact. No evidence of rash

## 2024-05-01 NOTE — ED STATDOCS - OBJECTIVE STATEMENT
44 y/o female with a PMHx of prediabetes, kidney stones, syncope presents to the ED c/o flank pain and vaginal bleeding x16 days. Pt reports passing clots. Pt states her GYN told her she is having vaginal bleeding because she is pre menopausal. No other complaints at this time.  ID#: [id number]. 44 y/o female with a PMHx of prediabetes, kidney stones, syncope presents to the ED c/o back pain and vaginal bleeding x16 days. Pt reports passing clots. Pt states her GYN told her she is having vaginal bleeding because she is pre menopausal. No other complaints at this time.  ID#: [id number].

## 2024-05-01 NOTE — ED STATDOCS - NSFOLLOWUPINSTRUCTIONS_ED_ALL_ED_FT
FOLLOW UP WITH THE OBGYN      Fibromas uterinos  Uterine Fibroids    Los fibromas uterinos son bultos de tejido (tumores) en el vientre (útero). Los fibromas no son cancerosos. La mayoría de las mujeres con esta afección no necesita tratamiento.    A veces, los fibromas pueden dificultar tener hijos. Si esto ocurre, es posible que necesite david cirugía para que le extirpen los fibromas.    ¿Cuáles son las causas?  Se desconoce la causa de esta afección.    ¿Qué incrementa el riesgo?  Usted tiene entre 30 y 40 años de edad y no pitts atravesado la menopausia. La menopausia es cuando no se ha tenido el período menstrual por 12 meses.  Tener antecedentes de fibromas en la rena.  Usted tiene ascendencia afroamericana.  Usted inició lopez período menstrual a los 10 años de edad o menos.  Usted no ha tenido hijos.  Tiene sobrepeso o mucho sobrepeso.  ¿Cuáles son los signos o síntomas?  Sangrado entre los períodos menstruales.  Hemorragias intensas mack el período menstrual.  Dolor en la wanda entre las caderas.  Necesidad de hacer pis (orinar) con mayor frecuencia que lo habitual.  No poder tener hijos (infertilidad).  No poder quedar embarazada (aborto espontáneo).  Muchas mujeres no tienen síntomas.    ¿Cómo se trata?  El tratamiento puede incluir:  Visitas de seguimiento con el médico para comprobar si hay cambios en james fibromas.  Medicamentos para aliviar el dolor, kalpana aspirina o ibuprofeno.  Terapia hormonal. Se puede administrar en forma de píldora, inyección o con un tipo de dispositivo anticonceptivo llamado DIU.  David cirugía que haría david de estas cosas:  Extraer los fibromas. [place] puede hacerse si desea quedar embarazada.  Extraer el útero (histerectomía).  Detener el flujo sanguíneo a los fibromas.  Siga estas instrucciones en lopez casa:  Medicamentos    Use los medicamentos de venta vega y los recetados solamente kalpana se lo haya indicado el médico.  Pregúntele al médico si debe hacer lo siguiente:  [place]il[place] comprimidos de giulia.  [place] más alimentos que contengan gran cantidad de giulia, kalpana las verduras de hojas de color bhaskar oscuro.  Control del dolor      Si se lo indican, póngase calor en la espalda o en el vientre. Hágalo con la frecuencia que le haya indicado el médico. Use la carroll de calor que el médico le recomiende, kalpana david compresa de calor húmedo o david almohadilla térmica. Para hacer esto:  Coloque david toalla entre la piel y la compresa o almohadilla térmica.  Aplique calor mack 20 a 30 minutos.  Retire la carroll de calor si la piel se le pone de color king brillante. [place] es muy importante. Si no puede sentir dolor, calor o frío, puede tener un mayor riesgo de quemarse.  Instrucciones generales    Informe al médico si hay algún cambio en lopez período menstrual, por ejemplo:  Hemorragia intensa que requiere un cambio de tampón o compresa más frecuente de lo normal.  Un cambio en la cantidad de días que le dura la menstruación.  Un cambio en los síntomas que aparecen con la menstruación. [place] podría provocar calambres en el vientre o dolor de espalda.  [place] con todas las visitas de seguimiento.  Comuníquese con un médico si:  Tiene dolor que no mejora con medicamentos o con calor. [place] puede incluir dolor o calambres en los siguientes lugares:  La wanda que se encuentra entre los huesos de las caderas.  La espalda.  El vientre.  Tiene un nuevo sangrado entre los períodos.  Observa más sangrado mack o entre períodos menstruales.  Se siente muy cansado, débil o sufre un desmayo.  Siente mareos.  Solicite ayuda de inmediato si:  Se desmaya.  Tiene dolor en la wanda entre los huesos de la cadera que empeora.  Tiene un sangrado que empapa un tampón o david compresa en 30 minutos o menos.  Resumen  Los fibromas uterinos son bultos de tejido (tumores) en el útero. No son cancerosos.  Pueden usarse medicamentos para aliviar el dolor, kalpana aspirina o ibuprofeno.  Comuníquese con un médico si tiene dolor o cólicos que no mejoran con los medicamentos.  Conozca cuáles son los síntomas por los que debe obtener ayuda de inmediato.  Esta información no tiene kalpana fin reemplazar el consejo del médico. Asegúrese de hacerle al médico cualquier pregunta que tenga.

## 2024-05-01 NOTE — ED STATDOCS - PATIENT PORTAL LINK FT
You can access the FollowMyHealth Patient Portal offered by [facility] by registering at the following website: [email or web address]. By joining [employer]’s FollowMyHealth portal, you will also be able to view your health information using other applications (apps) compatible with our system.

## 2024-05-01 NOTE — ED STATDOCS - PROGRESS NOTE DETAILS
Dkaota: pts h/h stable from a 2.5 weeks ago before bleeding began. will need to fu with gyn and return if worsening.

## 2024-05-01 NOTE — ED STATDOCS - ATTENDING APP SHARED VISIT CONTRIBUTION OF CARE
I, Earl Valdez, performed the initial face to face bedside interview with this patient regarding history of present illness, review of symptoms and relevant past medical, social and family history.  I completed an independent physical examination.  I was the initial provider who evaluated this patient. I have signed out the follow up of any pending tests (i.e. labs, radiological studies) to the ACP.  I have communicated the patient’s plan of care and disposition with the ACP.  The history, relevant review of systems, past medical and surgical history, medical decision making, and physical examination was documented by the scribe in my presence and I attest to the accuracy of the documentation.

## 2024-05-01 NOTE — ED ADULT NURSE NOTE - OBJECTIVE STATEMENT
pt c/o back pain, dizziness and vaginal bleeding for 16 days. says it is normally 12 days. bleeding is red with lots of clots. also has some nausea.  Adilene [id number]

## 2024-05-01 NOTE — ED STATDOCS - CLINICAL SUMMARY MEDICAL DECISION MAKING FREE TEXT BOX
44 y/o female with a PMHx of prediabetes, kidney stones, syncope presents to the ED c/o flank pain and vaginal bleeding x16 days. Pt reports passing clots. Pt states her GYN told her she is having vaginal bleeding because she is pre menopausal. Plan: US kidney and bladder, CBC, CMP, UA, HCG, urine culture.

## 2024-05-03 LAB
CULTURE RESULTS: SIGNIFICANT CHANGE UP
SPECIMEN SOURCE: SIGNIFICANT CHANGE UP

## 2024-05-09 ENCOUNTER — EMERGENCY (EMERGENCY)
Facility: HOSPITAL | Age: 44
LOS: 0 days | Discharge: HOME | End: 2024-05-09
Attending: STUDENT IN AN ORGANIZED HEALTH CARE EDUCATION/TRAINING PROGRAM
Payer: COMMERCIAL

## 2024-05-09 VITALS
WEIGHT: 145.06 LBS | SYSTOLIC BLOOD PRESSURE: 121 MMHG | HEIGHT: 63 IN | OXYGEN SATURATION: 97 % | HEART RATE: 79 BPM | TEMPERATURE: 98 F | DIASTOLIC BLOOD PRESSURE: 81 MMHG | RESPIRATION RATE: 18 BRPM

## 2024-05-09 DIAGNOSIS — R42 DIZZINESS AND GIDDINESS: ICD-10-CM

## 2024-05-09 DIAGNOSIS — R07.89 OTHER CHEST PAIN: ICD-10-CM

## 2024-05-09 DIAGNOSIS — Z98.890 OTHER SPECIFIED POSTPROCEDURAL STATES: Chronic | ICD-10-CM

## 2024-05-09 DIAGNOSIS — Z98.51 TUBAL LIGATION STATUS: Chronic | ICD-10-CM

## 2024-05-09 DIAGNOSIS — I45.10 UNSPECIFIED RIGHT BUNDLE-BRANCH BLOCK: ICD-10-CM

## 2024-05-09 DIAGNOSIS — Z45.09 ENCOUNTER FOR ADJUSTMENT AND MANAGEMENT OF OTHER CARDIAC DEVICE: Chronic | ICD-10-CM

## 2024-05-09 DIAGNOSIS — R55 SYNCOPE AND COLLAPSE: ICD-10-CM

## 2024-05-09 DIAGNOSIS — Z20.822 CONTACT WITH AND (SUSPECTED) EXPOSURE TO COVID-19: ICD-10-CM

## 2024-05-09 LAB
ALBUMIN SERPL ELPH-MCNC: 3.6 G/DL — SIGNIFICANT CHANGE UP (ref 3.3–5)
ALP SERPL-CCNC: 87 U/L — SIGNIFICANT CHANGE UP (ref 40–120)
ALT FLD-CCNC: 17 U/L — SIGNIFICANT CHANGE UP (ref 12–78)
ANION GAP SERPL CALC-SCNC: 5 MMOL/L — SIGNIFICANT CHANGE UP (ref 5–17)
APPEARANCE UR: CLEAR — SIGNIFICANT CHANGE UP
AST SERPL-CCNC: 16 U/L — SIGNIFICANT CHANGE UP (ref 15–37)
BACTERIA # UR AUTO: ABNORMAL /HPF
BASOPHILS # BLD AUTO: 0.05 K/UL — SIGNIFICANT CHANGE UP (ref 0–0.2)
BASOPHILS NFR BLD AUTO: 0.7 % — SIGNIFICANT CHANGE UP (ref 0–2)
BILIRUB SERPL-MCNC: 0.3 MG/DL — SIGNIFICANT CHANGE UP (ref 0.2–1.2)
BILIRUB UR-MCNC: NEGATIVE — SIGNIFICANT CHANGE UP
BUN SERPL-MCNC: 12 MG/DL — SIGNIFICANT CHANGE UP (ref 7–23)
CALCIUM SERPL-MCNC: 9.1 MG/DL — SIGNIFICANT CHANGE UP (ref 8.5–10.1)
CAST: 0 /LPF — SIGNIFICANT CHANGE UP (ref 0–4)
CHLORIDE SERPL-SCNC: 110 MMOL/L — HIGH (ref 96–108)
CO2 SERPL-SCNC: 26 MMOL/L — SIGNIFICANT CHANGE UP (ref 22–31)
COLOR SPEC: YELLOW — SIGNIFICANT CHANGE UP
CREAT SERPL-MCNC: 0.92 MG/DL — SIGNIFICANT CHANGE UP (ref 0.5–1.3)
D DIMER BLD IA.RAPID-MCNC: 157 NG/ML DDU — SIGNIFICANT CHANGE UP
DIFF PNL FLD: ABNORMAL
EGFR: 78 ML/MIN/1.73M2 — SIGNIFICANT CHANGE UP
EOSINOPHIL # BLD AUTO: 0.09 K/UL — SIGNIFICANT CHANGE UP (ref 0–0.5)
EOSINOPHIL NFR BLD AUTO: 1.3 % — SIGNIFICANT CHANGE UP (ref 0–6)
FLUAV AG NPH QL: SIGNIFICANT CHANGE UP
FLUBV AG NPH QL: SIGNIFICANT CHANGE UP
GLUCOSE SERPL-MCNC: 108 MG/DL — HIGH (ref 70–99)
GLUCOSE UR QL: NEGATIVE MG/DL — SIGNIFICANT CHANGE UP
HCT VFR BLD CALC: 35.2 % — SIGNIFICANT CHANGE UP (ref 34.5–45)
HGB BLD-MCNC: 11.6 G/DL — SIGNIFICANT CHANGE UP (ref 11.5–15.5)
IMM GRANULOCYTES NFR BLD AUTO: 0.1 % — SIGNIFICANT CHANGE UP (ref 0–0.9)
KETONES UR-MCNC: NEGATIVE MG/DL — SIGNIFICANT CHANGE UP
LEUKOCYTE ESTERASE UR-ACNC: ABNORMAL
LYMPHOCYTES # BLD AUTO: 1.6 K/UL — SIGNIFICANT CHANGE UP (ref 1–3.3)
LYMPHOCYTES # BLD AUTO: 23.2 % — SIGNIFICANT CHANGE UP (ref 13–44)
MAGNESIUM SERPL-MCNC: 1.7 MG/DL — SIGNIFICANT CHANGE UP (ref 1.6–2.6)
MCHC RBC-ENTMCNC: 31.2 PG — SIGNIFICANT CHANGE UP (ref 27–34)
MCHC RBC-ENTMCNC: 33 GM/DL — SIGNIFICANT CHANGE UP (ref 32–36)
MCV RBC AUTO: 94.6 FL — SIGNIFICANT CHANGE UP (ref 80–100)
MONOCYTES # BLD AUTO: 0.55 K/UL — SIGNIFICANT CHANGE UP (ref 0–0.9)
MONOCYTES NFR BLD AUTO: 8 % — SIGNIFICANT CHANGE UP (ref 2–14)
NEUTROPHILS # BLD AUTO: 4.59 K/UL — SIGNIFICANT CHANGE UP (ref 1.8–7.4)
NEUTROPHILS NFR BLD AUTO: 66.7 % — SIGNIFICANT CHANGE UP (ref 43–77)
NITRITE UR-MCNC: NEGATIVE — SIGNIFICANT CHANGE UP
PH UR: 6 — SIGNIFICANT CHANGE UP (ref 5–8)
PLATELET # BLD AUTO: 342 K/UL — SIGNIFICANT CHANGE UP (ref 150–400)
POCT URINE PREGNANCY TEST: NEGATIVE — SIGNIFICANT CHANGE UP
POTASSIUM SERPL-MCNC: 3.9 MMOL/L — SIGNIFICANT CHANGE UP (ref 3.5–5.3)
POTASSIUM SERPL-SCNC: 3.9 MMOL/L — SIGNIFICANT CHANGE UP (ref 3.5–5.3)
PROT SERPL-MCNC: 7.5 GM/DL — SIGNIFICANT CHANGE UP (ref 6–8.3)
PROT UR-MCNC: NEGATIVE MG/DL — SIGNIFICANT CHANGE UP
RBC # BLD: 3.72 M/UL — LOW (ref 3.8–5.2)
RBC # FLD: 12 % — SIGNIFICANT CHANGE UP (ref 10.3–14.5)
RBC CASTS # UR COMP ASSIST: 3 /HPF — SIGNIFICANT CHANGE UP (ref 0–4)
RSV RNA NPH QL NAA+NON-PROBE: SIGNIFICANT CHANGE UP
SARS-COV-2 RNA SPEC QL NAA+PROBE: SIGNIFICANT CHANGE UP
SODIUM SERPL-SCNC: 141 MMOL/L — SIGNIFICANT CHANGE UP (ref 135–145)
SP GR SPEC: 1.01 — SIGNIFICANT CHANGE UP (ref 1–1.03)
SQUAMOUS # UR AUTO: 6 /HPF — HIGH (ref 0–5)
TROPONIN I, HIGH SENSITIVITY RESULT: 17.67 NG/L — SIGNIFICANT CHANGE UP
UROBILINOGEN FLD QL: 0.2 MG/DL — SIGNIFICANT CHANGE UP (ref 0.2–1)
WBC # BLD: 6.89 K/UL — SIGNIFICANT CHANGE UP (ref 3.8–10.5)
WBC # FLD AUTO: 6.89 K/UL — SIGNIFICANT CHANGE UP (ref 3.8–10.5)
WBC UR QL: 4 /HPF — SIGNIFICANT CHANGE UP (ref 0–5)

## 2024-05-09 PROCEDURE — 99285 EMERGENCY DEPT VISIT HI MDM: CPT

## 2024-05-09 PROCEDURE — 85379 FIBRIN DEGRADATION QUANT: CPT

## 2024-05-09 PROCEDURE — 71045 X-RAY EXAM CHEST 1 VIEW: CPT | Mod: 26

## 2024-05-09 PROCEDURE — 93010 ELECTROCARDIOGRAM REPORT: CPT

## 2024-05-09 PROCEDURE — 85025 COMPLETE CBC W/AUTO DIFF WBC: CPT

## 2024-05-09 PROCEDURE — 83735 ASSAY OF MAGNESIUM: CPT

## 2024-05-09 PROCEDURE — 71045 X-RAY EXAM CHEST 1 VIEW: CPT

## 2024-05-09 PROCEDURE — 84484 ASSAY OF TROPONIN QUANT: CPT

## 2024-05-09 PROCEDURE — 36000 PLACE NEEDLE IN VEIN: CPT

## 2024-05-09 PROCEDURE — 82962 GLUCOSE BLOOD TEST: CPT

## 2024-05-09 PROCEDURE — 0241U: CPT

## 2024-05-09 PROCEDURE — 81025 URINE PREGNANCY TEST: CPT

## 2024-05-09 PROCEDURE — 80053 COMPREHEN METABOLIC PANEL: CPT

## 2024-05-09 PROCEDURE — 36415 COLL VENOUS BLD VENIPUNCTURE: CPT

## 2024-05-09 PROCEDURE — 93005 ELECTROCARDIOGRAM TRACING: CPT

## 2024-05-09 PROCEDURE — 99285 EMERGENCY DEPT VISIT HI MDM: CPT | Mod: 25

## 2024-05-09 PROCEDURE — 81001 URINALYSIS AUTO W/SCOPE: CPT

## 2024-05-09 RX ORDER — ACETAMINOPHEN 500 MG
650 TABLET ORAL ONCE
Refills: 0 | Status: COMPLETED | OUTPATIENT
Start: 2024-05-09 | End: 2024-05-09

## 2024-05-09 RX ORDER — SODIUM CHLORIDE 9 MG/ML
1000 INJECTION INTRAMUSCULAR; INTRAVENOUS; SUBCUTANEOUS ONCE
Refills: 0 | Status: COMPLETED | OUTPATIENT
Start: 2024-05-09 | End: 2024-05-09

## 2024-05-09 RX ADMIN — Medication 650 MILLIGRAM(S): at 11:22

## 2024-05-09 RX ADMIN — SODIUM CHLORIDE 1000 MILLILITER(S): 9 INJECTION INTRAMUSCULAR; INTRAVENOUS; SUBCUTANEOUS at 11:25

## 2024-05-09 NOTE — ED PROVIDER NOTE - CLINICAL SUMMARY MEDICAL DECISION MAKING FREE TEXT BOX
EKG, point-of-care sugar, labs, chest x-ray, IV fluids, UA, monitor and reassess EKG, point-of-care sugar, labs, chest x-ray, IV fluids, UA, monitor and reassess. Chest x-ray normal sinus rhythm without ischemic changes.  Labs show troponin within normal limits, D-dimer within normal limits.  Low heart score.  Chest x-ray shows no acute infiltrate or any other actionable findings.  UA negative for urinary tract infection.  On reassessment patient reports all her symptoms have resolved, is tolerating p.o., is ambulating with normal gait and remains neurologically intact.   presentation most consistent with vasovagal syncope.Advised to follow-up with PCP and cardiologist within 24 hours, given strict return precautions emergency department discharged home in stable condition

## 2024-05-09 NOTE — ED PROVIDER NOTE - PATIENT PORTAL LINK FT
You can access the FollowMyHealth Patient Portal offered by [facility] by registering at the following website: [email or web address]. By joining [employer]’s FollowMyHealth portal, you will also be able to view your health information using other applications (apps) compatible with our system.

## 2024-05-09 NOTE — ED ADULT TRIAGE NOTE - CHIEF COMPLAINT QUOTE
Ambulance to ER from [facility] with c/o chest pain and syncope no head strike, ASA 324mg given by EMS prior to arrival, last syncopal episode 6 months ago, glucose 147, EKG completed.

## 2024-05-09 NOTE — ED PROVIDER NOTE - NSFOLLOWUPINSTRUCTIONS_ED_ALL_ED_FT
You were seen and evaluated for syncopal episode.  This was most likely a vasovagal syncopal episode.  Your EKG, chest x-ray, labs, urine are all normal.  Please follow-up with your primary care doctor and cardiology referral within 24 hours Return to the Emergency Department for worsening or persistent symptoms, and/or ANY NEW OR CONCERNING SYMPTOMS. If you have issues obtaining follow up, please call: [phone] ([phone] or [phone]  to obtain a doctor or specialist who takes your insurance in your area.    Le atendieron y evaluaron por un episodio de síncope. Lo más probable es que se trate de un episodio de síncope vasovagal. Lopez electrocardiograma, radiografía de tórax, análisis de laboratorio y orina son normales. Ailyn un seguimiento con lopez médico de atención primaria y lopez referencia de cardiología dentro de las 24 horas. Regrese al Departamento de Emergencias si los síntomas empeoran o persisten, y/o CUALQUIER SÍNTOMA NUEVO O PREOCUPANTE. Si tiene problemas para obtener un seguimiento, llame al: [phone] o al [phone] para obtener un médico o especialista que acepte lopez seguro en lopez área.        Occitan    Síncope en los adultos  Syncope, Adult  Outline of the head showing blood vessels that supply the brain.  El síncope hace referencia a david afección en la cual david persona pierde la conciencia temporalmente. También se lo puede conocer kalpana desmayo. La causa del síncope es david disminución súbita del flujo de dameon al cerebro. Puede suceder por diversos motivos.    La mayoría de las causas del síncope no son peligrosas. Puede desencadenarse por cosas tales kalpana pinchazos de agujas, anthony dameon, sentir dolor o emociones intensas. Sin embargo, el síncope también puede ser un signo de un problema médico grave, kalpana david anomalía cardíaca. Otras causas pueden ser la deshidratación, las migrañas o milton medicamentos que bajan la presión arterial. El médico puede hacerle estudios para encontrar el motivo por el cual tiene un síncope.    Ante un desmayo, obtenga ayuda médica de inmediato. Comuníquese con el servicio de emergencias de lopez localidad (911 en los [place]).    Siga estas indicaciones en lopez casa:  Esté atento a cualquier cambio en los síntomas. Para mantener lopez seguridad y ayudar a aliviar james síntomas, tome estas medidas:    Cómo saber cuándo puede estar a punto de desmayarse    Los signos de que alguien está por desmayarse incluyen lo siguiente:  Sentirse mareado, débil, aturdido o kalpana si la habitación estuviera girando.  Sentir náuseas.  Anthony manchas o anthony todo solares o todo selena en el osmar de visión.  Tener la piel fría y húmeda o sentir calor y sudar.  Tener un zumbido en los oídos (acúfenos).  Si comienza a sentir que podría desmayarse, siéntese o recuéstese inmediatamente. Si se sienta, baje la britt y colóquela entre las piernas. Si se recuesta, levante (eleve) los pies por encima del nivel del corazón.  Respire profundamente y de manera continua. Espere hasta que los síntomas hayan desaparecido.  Pídale a alguien que se quede con usted hasta que se sienta estable.  Medicamentos    Use los medicamentos de venta vega y los recetados solamente kalpana se lo haya indicado el médico.  Si está tomando medicamentos para la presión arterial o para el corazón, póngase de pie lentamente, tómese algunos minutos para permanecer sentado y luego párese. [place] puede reducir los mareos y el riesgo de tener un síncope.  Estilo de roshan    No conduzca vehículos, no use maquinarias ni practique deportes hasta que el médico lo autorice.  No cristian alcohol.  No consuma ningún producto que contenga nicotina o tabaco. Estos productos incluyen cigarrillos, tabaco para mascar y aparatos de vapeo, kalpana los cigarrillos electrónicos. Si necesita ayuda para dejar de consumir estos productos, consulte al médico.  Evite saunas y bañeras de hidromasajes.  Indicaciones generales    [place] con el médico acerca de james síntomas. Es posible que deba realizarse estudios para entender la causa del síncope.  Cristian suficiente líquido kalpana para mantener la orina de color amarillo pálido.  Evite permanecer de pie mucho tiempo. Si debe permanecer de pie mack mucho tiempo, realice movimientos kalpana los siguientes:   las piernas.  Cruzar las piernas.  Flexionar y estirar los músculos de las piernas.  Ponerse en cuclillas.  Concurra a todas las visitas de seguimiento. [place] es importante.  Comuníquese con un médico si:  Tiene episodios kalpana si fuera a desmayarse.  Solicite ayuda de inmediato si:  Se desmaya.  Se golpea la britt o se lesiona después de desmayarse.  Tiene cualquiera de estos síntomas que pueden indicar problemas en el corazón:  Latidos cardíacos acelerados o irregulares (palpitaciones).  Dolor fuera de lo normal en el pecho, el abdomen o la espalda.  Falta de aire.  Tiene david convulsión.  Siente un dolor de britt intenso.  Se siente confundido.  Tiene problemas de visión.  Tiene debilidad intensa o dificultad para caminar.  Sangra por la boca o el recto, o james heces son de color selena o aspecto alquitranado.  Estos síntomas pueden representar un problema grave que constituye david emergencia. No espere hasta que los síntomas desaparezcan. Solicite atención médica de inmediato. Comuníquese con el servicio de emergencias de lopez localidad (911 en los [place]). No conduzca por james propios medios hasta el hospital.    Resumen  El síncope hace referencia a david afección en la cual david persona pierde la conciencia temporalmente. También se lo puede conocer kalpana desmayo. La causa del síncope es david disminución súbita del flujo de dameon al cerebro.  Los signos de que puede estar por desmayarse incluyen mareos, sensación de desvanecimiento, náuseas, cambios repentinos en la visión o piel fría y húmeda.  Aunque la mayoría de las causas no implican un peligro, el síncope puede ser un signo de un problema médico grave. Si se desmaya, solicite ayuda de inmediato.  Si comienza a sentir que podría desmayarse, siéntese o recuéstese inmediatamente. Si se sienta, baje la britt y colóquela entre las piernas. Si se recuesta, levante (eleve) los pies por encima del nivel del corazón.  Esta información no tiene kalpana fin reemplazar el consejo del médico. Asegúrese de hacerle al médico cualquier pregunta que tenga.

## 2024-05-09 NOTE — ED ADULT NURSE NOTE - OBJECTIVE STATEMENT
Patient arrives from working at [employer] after having a syncopal episode. Pt denied falling and states a coworker was able to catch her before falling down. Pt states having chest pain and left shoulder pain.

## 2024-05-09 NOTE — ED PROVIDER NOTE - OBJECTIVE STATEMENT
41-year-old female presents from work after a syncopal episode.  Patient reports that she did eat breakfast this morning, while at work felt lightheaded and then ended up passing out and her boss's arms.  Was unconscious for a few seconds.  States prior to the event she was feeling nauseous and had some chest discomfort.  States that she has had several of these episodes in the past, few years ago was evaluated by cardiologist who told her that everything looked normal, does not recall cardiologist name.  Denies fevers, chills, shortness of breath, cough, abdominal pain, urinary symptoms, diarrhea, black bloody stools, vaginal bleeding, rash.  No sick contacts or recent travel.    Declines translation services, patient's employer at bedside to translate

## 2024-06-03 NOTE — ED ADULT NURSE NOTE - NS ED NURSE LEVEL OF CONSCIOUSNESS ORIENTATION
Ice the area of pain for 15 minutes three times a day.    Ibuprofen 400 mg three times a day for 7-10 days, then as needed.    Stretching exercises.    Set up appointment with primary physician.--family practice or internal medicine doctor.   Oriented - self; Oriented - place; Oriented - time

## 2024-07-19 ENCOUNTER — NON-APPOINTMENT (OUTPATIENT)
Age: 44
End: 2024-07-19

## 2024-07-19 ENCOUNTER — APPOINTMENT (OUTPATIENT)
Dept: CARDIOLOGY | Facility: CLINIC | Age: 44
End: 2024-07-19
Payer: COMMERCIAL

## 2024-07-19 VITALS
BODY MASS INDEX: 27.56 KG/M2 | HEART RATE: 65 BPM | SYSTOLIC BLOOD PRESSURE: 100 MMHG | HEIGHT: 61 IN | OXYGEN SATURATION: 99 % | RESPIRATION RATE: 14 BRPM | WEIGHT: 146 LBS | DIASTOLIC BLOOD PRESSURE: 60 MMHG

## 2024-07-19 VITALS
HEART RATE: 65 BPM | OXYGEN SATURATION: 99 % | SYSTOLIC BLOOD PRESSURE: 100 MMHG | BODY MASS INDEX: 27.59 KG/M2 | WEIGHT: 146 LBS | DIASTOLIC BLOOD PRESSURE: 60 MMHG

## 2024-07-19 DIAGNOSIS — I95.9 HYPOTENSION, UNSPECIFIED: ICD-10-CM

## 2024-07-19 DIAGNOSIS — Z45.09 ENCOUNTER FOR ADJUSTMENT AND MANAGEMENT OF OTHER CARDIAC DEVICE: ICD-10-CM

## 2024-07-19 DIAGNOSIS — R55 SYNCOPE AND COLLAPSE: ICD-10-CM

## 2024-07-19 PROCEDURE — 93000 ELECTROCARDIOGRAM COMPLETE: CPT

## 2024-07-19 PROCEDURE — 99204 OFFICE O/P NEW MOD 45 MIN: CPT | Mod: 25

## 2024-07-26 ENCOUNTER — APPOINTMENT (OUTPATIENT)
Dept: CARDIOLOGY | Facility: CLINIC | Age: 44
End: 2024-07-26

## 2024-08-20 ENCOUNTER — EMERGENCY (EMERGENCY)
Facility: HOSPITAL | Age: 44
LOS: 0 days | Discharge: HOME | End: 2024-08-20
Attending: EMERGENCY MEDICINE
Payer: COMMERCIAL

## 2024-08-20 VITALS
OXYGEN SATURATION: 100 % | TEMPERATURE: 99 F | DIASTOLIC BLOOD PRESSURE: 74 MMHG | RESPIRATION RATE: 17 BRPM | HEART RATE: 72 BPM | SYSTOLIC BLOOD PRESSURE: 112 MMHG

## 2024-08-20 VITALS — WEIGHT: 146.39 LBS

## 2024-08-20 DIAGNOSIS — Z98.890 OTHER SPECIFIED POSTPROCEDURAL STATES: Chronic | ICD-10-CM

## 2024-08-20 DIAGNOSIS — R30.0 DYSURIA: ICD-10-CM

## 2024-08-20 DIAGNOSIS — M54.9 DORSALGIA, UNSPECIFIED: ICD-10-CM

## 2024-08-20 DIAGNOSIS — N83.202 UNSPECIFIED OVARIAN CYST, LEFT SIDE: ICD-10-CM

## 2024-08-20 DIAGNOSIS — Z87.442 PERSONAL HISTORY OF URINARY CALCULI: ICD-10-CM

## 2024-08-20 DIAGNOSIS — Z98.51 TUBAL LIGATION STATUS: Chronic | ICD-10-CM

## 2024-08-20 DIAGNOSIS — R31.9 HEMATURIA, UNSPECIFIED: ICD-10-CM

## 2024-08-20 DIAGNOSIS — N12 TUBULO-INTERSTITIAL NEPHRITIS, NOT SPECIFIED AS ACUTE OR CHRONIC: ICD-10-CM

## 2024-08-20 LAB
ALBUMIN SERPL ELPH-MCNC: 3.5 G/DL — SIGNIFICANT CHANGE UP (ref 3.3–5)
ALP SERPL-CCNC: 76 U/L — SIGNIFICANT CHANGE UP (ref 40–120)
ALT FLD-CCNC: 18 U/L — SIGNIFICANT CHANGE UP (ref 12–78)
ANION GAP SERPL CALC-SCNC: 5 MMOL/L — SIGNIFICANT CHANGE UP (ref 5–17)
APPEARANCE UR: ABNORMAL
AST SERPL-CCNC: 15 U/L — SIGNIFICANT CHANGE UP (ref 15–37)
BACTERIA # UR AUTO: ABNORMAL /HPF
BASOPHILS # BLD AUTO: 0.05 K/UL — SIGNIFICANT CHANGE UP (ref 0–0.2)
BASOPHILS NFR BLD AUTO: 0.4 % — SIGNIFICANT CHANGE UP (ref 0–2)
BILIRUB SERPL-MCNC: 0.5 MG/DL — SIGNIFICANT CHANGE UP (ref 0.2–1.2)
BILIRUB UR-MCNC: NEGATIVE — SIGNIFICANT CHANGE UP
BUN SERPL-MCNC: 11 MG/DL — SIGNIFICANT CHANGE UP (ref 7–23)
CALCIUM SERPL-MCNC: 8.7 MG/DL — SIGNIFICANT CHANGE UP (ref 8.5–10.1)
CAST: 0 /LPF — SIGNIFICANT CHANGE UP (ref 0–4)
CHLORIDE SERPL-SCNC: 109 MMOL/L — HIGH (ref 96–108)
CO2 SERPL-SCNC: 26 MMOL/L — SIGNIFICANT CHANGE UP (ref 22–31)
COLOR SPEC: YELLOW — SIGNIFICANT CHANGE UP
CREAT SERPL-MCNC: 0.76 MG/DL — SIGNIFICANT CHANGE UP (ref 0.5–1.3)
DIFF PNL FLD: ABNORMAL
EGFR: 98 ML/MIN/1.73M2 — SIGNIFICANT CHANGE UP
EOSINOPHIL # BLD AUTO: 0.08 K/UL — SIGNIFICANT CHANGE UP (ref 0–0.5)
EOSINOPHIL NFR BLD AUTO: 0.6 % — SIGNIFICANT CHANGE UP (ref 0–6)
GLUCOSE SERPL-MCNC: 101 MG/DL — HIGH (ref 70–99)
GLUCOSE UR QL: NEGATIVE MG/DL — SIGNIFICANT CHANGE UP
HCG SERPL-ACNC: <1 MIU/ML — SIGNIFICANT CHANGE UP
HCT VFR BLD CALC: 37.6 % — SIGNIFICANT CHANGE UP (ref 34.5–45)
HGB BLD-MCNC: 12.5 G/DL — SIGNIFICANT CHANGE UP (ref 11.5–15.5)
HIV 1 & 2 AB SERPL IA.RAPID: SIGNIFICANT CHANGE UP
IMM GRANULOCYTES NFR BLD AUTO: 0.4 % — SIGNIFICANT CHANGE UP (ref 0–0.9)
KETONES UR-MCNC: NEGATIVE MG/DL — SIGNIFICANT CHANGE UP
LACTATE SERPL-SCNC: 0.6 MMOL/L — LOW (ref 0.7–2)
LEUKOCYTE ESTERASE UR-ACNC: ABNORMAL
LIDOCAIN IGE QN: 39 U/L — SIGNIFICANT CHANGE UP (ref 13–75)
LYMPHOCYTES # BLD AUTO: 1.45 K/UL — SIGNIFICANT CHANGE UP (ref 1–3.3)
LYMPHOCYTES # BLD AUTO: 11 % — LOW (ref 13–44)
MCHC RBC-ENTMCNC: 29.4 PG — SIGNIFICANT CHANGE UP (ref 27–34)
MCHC RBC-ENTMCNC: 33.2 GM/DL — SIGNIFICANT CHANGE UP (ref 32–36)
MCV RBC AUTO: 88.5 FL — SIGNIFICANT CHANGE UP (ref 80–100)
MONOCYTES # BLD AUTO: 0.6 K/UL — SIGNIFICANT CHANGE UP (ref 0–0.9)
MONOCYTES NFR BLD AUTO: 4.6 % — SIGNIFICANT CHANGE UP (ref 2–14)
NEUTROPHILS # BLD AUTO: 10.95 K/UL — HIGH (ref 1.8–7.4)
NEUTROPHILS NFR BLD AUTO: 83 % — HIGH (ref 43–77)
NITRITE UR-MCNC: NEGATIVE — SIGNIFICANT CHANGE UP
PH UR: 6.5 — SIGNIFICANT CHANGE UP (ref 5–8)
PLATELET # BLD AUTO: 354 K/UL — SIGNIFICANT CHANGE UP (ref 150–400)
POTASSIUM SERPL-MCNC: 3.9 MMOL/L — SIGNIFICANT CHANGE UP (ref 3.5–5.3)
POTASSIUM SERPL-SCNC: 3.9 MMOL/L — SIGNIFICANT CHANGE UP (ref 3.5–5.3)
PROT SERPL-MCNC: 7.4 GM/DL — SIGNIFICANT CHANGE UP (ref 6–8.3)
PROT UR-MCNC: 100 MG/DL
RBC # BLD: 4.25 M/UL — SIGNIFICANT CHANGE UP (ref 3.8–5.2)
RBC # FLD: 13.6 % — SIGNIFICANT CHANGE UP (ref 10.3–14.5)
RBC CASTS # UR COMP ASSIST: 85 /HPF — HIGH (ref 0–4)
SODIUM SERPL-SCNC: 140 MMOL/L — SIGNIFICANT CHANGE UP (ref 135–145)
SP GR SPEC: 1.01 — SIGNIFICANT CHANGE UP (ref 1–1.03)
SQUAMOUS # UR AUTO: 2 /HPF — SIGNIFICANT CHANGE UP (ref 0–5)
UROBILINOGEN FLD QL: 0.2 MG/DL — SIGNIFICANT CHANGE UP (ref 0.2–1)
WBC # BLD: 13.18 K/UL — HIGH (ref 3.8–10.5)
WBC # FLD AUTO: 13.18 K/UL — HIGH (ref 3.8–10.5)
WBC UR QL: >998 /HPF — HIGH (ref 0–5)

## 2024-08-20 PROCEDURE — 74177 CT ABD & PELVIS W/CONTRAST: CPT | Mod: 26,MC

## 2024-08-20 PROCEDURE — 86703 HIV-1/HIV-2 1 RESULT ANTBDY: CPT

## 2024-08-20 PROCEDURE — 96374 THER/PROPH/DIAG INJ IV PUSH: CPT | Mod: XU

## 2024-08-20 PROCEDURE — 99285 EMERGENCY DEPT VISIT HI MDM: CPT

## 2024-08-20 PROCEDURE — 85025 COMPLETE CBC W/AUTO DIFF WBC: CPT

## 2024-08-20 PROCEDURE — 86803 HEPATITIS C AB TEST: CPT

## 2024-08-20 PROCEDURE — 80053 COMPREHEN METABOLIC PANEL: CPT

## 2024-08-20 PROCEDURE — 96375 TX/PRO/DX INJ NEW DRUG ADDON: CPT

## 2024-08-20 PROCEDURE — 87086 URINE CULTURE/COLONY COUNT: CPT

## 2024-08-20 PROCEDURE — 83605 ASSAY OF LACTIC ACID: CPT

## 2024-08-20 PROCEDURE — 99284 EMERGENCY DEPT VISIT MOD MDM: CPT | Mod: 25

## 2024-08-20 PROCEDURE — 81001 URINALYSIS AUTO W/SCOPE: CPT

## 2024-08-20 PROCEDURE — 36415 COLL VENOUS BLD VENIPUNCTURE: CPT

## 2024-08-20 PROCEDURE — 87186 SC STD MICRODIL/AGAR DIL: CPT

## 2024-08-20 PROCEDURE — 83690 ASSAY OF LIPASE: CPT

## 2024-08-20 PROCEDURE — 87040 BLOOD CULTURE FOR BACTERIA: CPT

## 2024-08-20 PROCEDURE — 84702 CHORIONIC GONADOTROPIN TEST: CPT

## 2024-08-20 PROCEDURE — 74177 CT ABD & PELVIS W/CONTRAST: CPT | Mod: MC

## 2024-08-20 RX ORDER — CEFPODOXIME PROXETIL 100 MG
1 TABLET ORAL
Qty: 20 | Refills: 0
Start: 2024-08-20 | End: 2024-08-29

## 2024-08-20 RX ORDER — KETOROLAC TROMETHAMINE 10 MG
30 TABLET ORAL ONCE
Refills: 0 | Status: DISCONTINUED | OUTPATIENT
Start: 2024-08-20 | End: 2024-08-20

## 2024-08-20 RX ORDER — ONDANSETRON HCL/PF 4 MG/2 ML
4 VIAL (ML) INJECTION ONCE
Refills: 0 | Status: COMPLETED | OUTPATIENT
Start: 2024-08-20 | End: 2024-08-20

## 2024-08-20 RX ORDER — BACTERIOSTATIC SODIUM CHLORIDE 0.9 %
1000 VIAL (ML) INJECTION ONCE
Refills: 0 | Status: COMPLETED | OUTPATIENT
Start: 2024-08-20 | End: 2024-08-20

## 2024-08-20 RX ADMIN — Medication 1000 MILLIGRAM(S): at 11:27

## 2024-08-20 RX ADMIN — Medication 30 MILLIGRAM(S): at 11:35

## 2024-08-20 RX ADMIN — Medication 4 MILLIGRAM(S): at 11:27

## 2024-08-20 RX ADMIN — Medication 1000 MILLILITER(S): at 11:27

## 2024-08-20 NOTE — ED STATDOCS - CLINICAL SUMMARY MEDICAL DECISION MAKING FREE TEXT BOX
#[id number]  43-year-old female no medical issues presents the emergency department with urinary symptoms.  Patient states she has urinary frequency burning and hematuria.  States that she was on Keflex finished the course about a week ago however is still having symptoms now associated with nausea no vomiting no fevers but she does have chills she is also having back pain.  States that she has had kidney stone in the past as well.  Exam here with mild bilateral CVA tenderness suprapubic tenderness to palpation will workup to rule out UTI/pyelonephritis check labs urine imaging symptom control reassess.

## 2024-08-20 NOTE — ED ADULT NURSE NOTE - OBJECTIVE STATEMENT
Pt presents to ED c/o abdominal pain since this AM. Pt denies N/V/D, urinary symptoms, fever/chills. Offers no other complaints at this time. AxOx4, ambulatory, RR even and unlabored on RA.

## 2024-08-20 NOTE — ED ADULT TRIAGE NOTE - CHIEF COMPLAINT QUOTE
PT comes to the ER complaining of abdominal pain since AM. denies N/V/D. denies urinary symptoms. no other complaints at this time. NKDA

## 2024-08-20 NOTE — ED STATDOCS - NSFOLLOWUPINSTRUCTIONS_ED_ALL_ED_FT
Follow up with your PCP. Take medications as prescribed starting tomorrow. Please return to the ED for new or worsening symptoms.    Ailyn un seguimiento con lamb PCP. [place] los medicamentos según lo recetado a partir de mañana. Regrese al servicio de urgencias si los síntomas aparecen o empeoran.    Pielonefritis en los adultos  Pyelonephritis, Adult  Body outline showing the urinary system, with a close-up of a normal kidney and an infected kidney.  La pielonefritis es david infección que se produce en el riñón. Los riñones son los órganos que filtran la dameon y pasan los residuos del torrente sanguíneo a la orina. La orina sale desde los riñones, a través de tubos llamados uréteres, hacia la vejiga. Hay dos tipos principales de esta afección:  Pielonefritis aguda. Se trata de infecciones que aparecen rápidamente y sin ninguna advertencia.  Pielonefritis crónica. Estas infecciones freeman mucho tiempo.  En la mayoría de los casos, la infección desaparece sin tratamiento. En los casos más graves, la infección puede propagarse al torrente sanguíneo u ocasionar otros problemas en los riñones.    ¿Cuáles son las causas?  A menudo, la causa de esta enfermedad son las bacterias. Las bacterias pueden desplazarse:  Desde la vejiga hasta el riñón. Custar puede ocurrir después de tener david infección en la vejiga (cistitis) o david infección urinaria (IU).  Desde el torrente sanguíneo hasta el riñón.  ¿Qué incrementa el riesgo?  Es más probable que sufra esta afección si:  Es ene. El riesgo es aún mayor si está embarazada.  Es david persona de edad avanzada.  Tiene lo siguiente:  Diabetes.  Prostatitis. Es david inflamación de la próstata.  Cálculos renales o en la vejiga.  Problemas en los riñones o en los uréteres.  Cáncer.  Lesión de la médula cam o daño nervioso alrededor de la vejiga.  Tiene colocado un tubo blando (catéter) en la vejiga.  Es sexualmente activo y usa espermicidas.  Tiene o ha tenido david infección urinaria (IU).  ¿Cuáles son los signos o síntomas?  Los síntomas de esta afección incluyen:  Necesidad imperiosa de orinar que es collin o que no desaparece. También orina con más frecuencia que lo normal.  Sensación de ardor o escozor al orinar.  Dolor. Puede presentarse en abdomen, la espalda, el costado del cuerpo o la geremias.  Fiebre o escalofríos.  Náuseas o vómitos.  Orina con dameon, oscura, turbia o con mal olor.  ¿Cómo se diagnostica?  Esta afección se puede diagnosticar en función de los antecedentes médicos y un examen físico. También pueden hacerle estudios, por ejemplo:  Análisis de orina.  Análisis de dameon.  Estudios de diagnóstico por imágenes de los riñones. Estos pueden incluir david ecografía o david exploración por tomografía computarizada (TC).  ¿Cómo se trata?  El tratamiento de esta afección depende de la gravedad de la infección.  Si la infección es leve y se detecta de forma temprana, pueden administrarle antibióticos por vía oral. Deberá beber mucho líquido.  Si la infección es más grave, es posible que deba permanecer en el hospital y recibir antibióticos por vía intravenosa (IV). También puede recibir líquidos a través de david vía intravenosa. Después de dejar el hospital, es posible que deba milton antibióticos por vía oral.  Es posible que se necesiten otros tratamientos. Estos dependerán de la causa de la infección.    Siga estas instrucciones en lamb casa:  Comida y bebida    Lara suficiente líquido kalpana para mantener la orina de color amarillo pálido.  Evite la cafeína, el té y las bebidas gaseosas. Estas sustancias pueden irritar la vejiga.  Instrucciones generales    Use los medicamentos recetados y de venta vega kalpana se lo haya indicado el médico. T[place] los antibióticos aunque comience a sentirse mejor.  Orine con frecuencia. Evite retener la orina mack largos períodos.  Orine antes y después de las relaciones sexuales.  Si es ene, higienícese de adelante hacia atrás después de tener deposiciones. Use cada papel solamente david vez.  Concurra a todas las visitas de seguimiento. Lamb médico querrá asegurarse de que la infección se haya resuelto.  Comuníquese con un médico si:  Los síntomas no mejoran después de 2 vignesh de tratamiento.  Rubi síntomas empeoran.  Tiene fiebre o escalofríos.  No puede milton los antibióticos.  Solicite ayuda de inmediato si:  Vomita cada vez que come o roseanne.  Tiene dolor intenso en la espalda o en el costado.  Se siente muy débil o se desmaya.  Esta información no tiene kalpana fin reemplazar el consejo del médico. Asegúrese de hacerle al médico cualquier pregunta que tenga.

## 2024-08-20 NOTE — ED STATDOCS - OBJECTIVE STATEMENT
#[id number]  43-year-old female no medical issues presents the emergency department with urinary symptoms.  Patient states she has urinary frequency burning and hematuria.  States that she was on Keflex finished the course about a week ago however is still having symptoms now associated with nausea no vomiting no fevers but she does have chills she is also having back pain.  States that she has had kidney stone in the past as well.  Exam here with mild bilateral CVA tenderness suprapubic tenderness to palpation will workup to rule out UTI/pyelonephritis check labs urine imaging symptom control reassess.

## 2024-08-20 NOTE — ED STATDOCS - CCCP TRG CHIEF CMPLNT
Medicare Wellness Visit  Plan for Preventive Care    A good way for you to stay healthy is to use preventive care.  Medicare covers many services that can help you stay healthy.* The goal of these services is to find any health problems as quickly as possible. Finding problems early can help make them easier to treat.  Your personal plan below lists the services you may need and when they are due.     Health Maintenance Summary     DTaP/Tdap/Td Vaccine (1 - Tdap)  Overdue since 1/11/1954    Shingles Vaccine (1 of 2)  Overdue since 1/11/1985    Pneumococcal Vaccine 65+ (1 of 2 - PCV13)  Overdue since 1/11/2000    Influenza Vaccine (1)  Overdue since 9/1/2019    Medicare Wellness 65+ (Yearly)  Due soon on 2/6/2020    Depression Screening (Yearly)  Due soon on 2/6/2020    Meningococcal Vaccine   Aged Out           Preventive Care for Women and Men    Heart Screenings (Cardiovascular):  · Blood tests are used to check your cholesterol, lipid and triglyceride levels. High levels can increase your risk for heart disease and stroke. High levels can be treated with medications, diet and exercise. Lowering your levels can help keep your heart and blood vessels healthy.  Your provider will order these tests if they are needed.    · An ultrasound is done to see if you have an abdominal aortic aneurysm (AAA).  This is an enlargement of one of the main blood vessels that delivers blood to the body.   In the [place], 9,000 deaths are caused by AAA.  You may not even know you have this problem and as many as 1 in 3 people will have a serious problem if it is not treated.  Early diagnosis allows for more effective treatment and cure.  If you have a family history of AAA or are a male age 65-75 who has smoked, you are at higher risk of an AAA.  Your provider can order this test, if needed.    Colorectal Screening:  · There are many tests that are used to check for cancer of your colon and rectum. You and your provider should  discuss what test is best for you and when to have it done.  Options include:  · Screening Colonoscopy: exam of the entire colon, seen through a flexible lighted tube.  · Flexible Sigmoidoscopy: exam of the last third (sigmoid portion) of the colon and rectum, seen through a flexible lighted tube.  · Cologuard DNA stool test: a sample of your stool is used to screen for cancer and unseen blood in your stool.  · Fecal Occult Blood Test: a sample of your stool is studied to find any unseen blood    Flu Shot:  · An immunization that helps to prevent influenza (the flu). You should get this every year. The best time to get the shot is in the fall.    Pneumococcal Shot:  • Vaccines are available that can help prevent pneumococcal disease, which is any type of infection caused by Streptococcus pneumoniae bacteria.   Their use can prevent some cases of pneumonia, meningitis, and sepsis. There are two types of pneumococcal vaccines:   o Conjugate vaccines (PCV-13 or Prevnar 13®) - helps protect against the 13 types of pneumococcal bacteria that are the most common causes of serious infections in children and adults.    o Polysaccharide vaccine (PPSV23 or Ofyptccxo80®) - helps protect against 23 types of pneumococcal bacteria for patients who are recommended to get it.  These vaccines should be given at least 12 months apart.  A booster is usually not needed.     Hepatitis B Shot:  · An immunization that helps to protect people from getting Hepatitis B. Hepatitis B is a virus that spreads through contact with infected blood or body fluids. Many people with the virus do not have symptoms.  The virus can lead to serious problems, such as liver disease. Some people are at higher risk than others. Your doctor will tell you if you need this shot.     Diabetes Screening:  · A test to measure sugar (glucose) in your blood is called a fasting blood sugar. Fasting means you cannot have food or drink for at least 8 hours before the  test. This test can detect diabetes long before you may notice symptoms.    Glaucoma Screening:  · Glaucoma screening is performed by your eye doctor. The test measures the fluid pressure inside your eyes to determine if you have glaucoma.     Hepatitis C Screening:  · A blood test to see if you have the hepatitis C virus.  Hepatitis C attacks the liver and is a major cause of chronic liver disease.  Medicare will cover a single screening for all adults born between 1945 & 1965, or high risk patients (people who have injected illegal drugs or people who have had blood transfusions).  High risk patients who continue to inject illegal drugs can be screened for Hepatitis C every year.    Smoking and Tobacco-Use Cessation Counseling:  · Tobacco is the single greatest cause of disease and early death in our country today. Medication and counseling together can increase a person’s chance of quitting for good.   · Medicare covers two quitting attempts per year, with four counseling sessions per attempt (eight sessions in a 12 month period)    Preventive Screening tests for Women    Screening Mammograms and Breast Exams:  · An x-ray of your breasts to check for breast cancer before you or your doctor may be able to feel it.  If breast cancer is found early it can usually be treated with success.    Pelvic Exams and Pap Tests:  · An exam to check for cervical and vaginal cancer. A Pap test is a lab test in which cells are taken from your cervix and sent to the lab to look for signs of cervical cancer. If cancer of the cervix is found early, chances for a cure are good. Testing can generally end at age 65, or if a woman has a hysterectomy for a benign condition. Your provider may recommend more frequent testing if certain abnormal results are found.    Bone Mass Measurements:  · A painless x-ray of your bone density to see if you are at risk for a broken bone. Bone density refers to the thickness of bones or how tightly the  bone tissue is packed.    Preventive Screening tests for Men    Prostate Screening:  · Should you have a prostate cancer test (PSA)?  It is up to you to decide if you want a prostate cancer test. Talk to your clinician to find out if the test is right for you.  Things for you to consider and talk about should include:  · Benefits and harms of the test  · Your family history  · How your race/ethnicity may influence the test  · If the test may impact other medical conditions you have  · Your values on screenings and treatments    *Medicare pays for many preventive services to keep you healthy. For some of these services, you might have to pay a deductible, coinsurance, and / or copayment.  The amounts vary depending on the type of services you need and the kind of Medicare health plan you have.               abdominal pain

## 2024-08-20 NOTE — ED STATDOCS - PROGRESS NOTE DETAILS
43-year-old female recently treated with Keflex for UTI on 8/8 presents to the emergency department with persistent dysuria and hematuria now associated with back pain, nausea, chills.  Denies fevers or vomiting.  Patient endorses renal stone April 2024.  Vitals are stable.  PE demonstrates suprapubic tenderness to palpation, bilateral CVA tenderness palpation, rest of exam unremarkable.  Differential diagnosis includes UTI/pyelonephritis versus kidney stone.  Plan for labs, CT abdomen pelvis, urinalysis, symptom control, fluids, reassess. - Janice Hammer PA-C Urinalysis reviewed, positive for infection.  White blood cell count 13, rest of labs pending.  CT pending.  Will draw blood cultures and lactate and give dose of ceftriaxone. - Janice Hammer PA-C Remaining labs and imaging reviewed.  Rest of labs within normal limits.  CT abdomen pelvis demonstrates right pyelonephritis, urothelial thickening of the right ureter consistent with infection, bladder wall thickening consistent with infection.  Also demonstrates left adnexal cyst.  No renal stone appreciated.  Discussed all finds with patient.  Will start patient on cefpodoxime for pyelonephritis, advised to follow-up with her OB/GYN regarding cyst.  Strict return precautions to the ED were discussed.  All questions and concerns were addressed at this time.  [id number] - Janice Hammer PA-C

## 2024-08-20 NOTE — ED STATDOCS - PHYSICAL EXAMINATION
Constitutional: NAD AAOx3  Eyes: PERRLA EOMI  Head: Normocephalic atraumatic  Mouth: MMM  Cardiac: regular rate   Resp: unlabored breathing  GI: mild bilateral CVA tenderness, mild suprapubic tenderness  Neuro: grossly normal and intact  Skin: No visible rashes Constitutional: NAD AAOx3  Eyes: PERRLA EOMI  Head: Normocephalic atraumatic  Mouth: MMM  Cardiac: regular rate   Resp: unlabored breathing clear b/l   GI: mild bilateral CVA tenderness, mild suprapubic tenderness  Neuro: grossly normal and intact  Skin: No visible rashes

## 2024-08-20 NOTE — ED STATDOCS - PATIENT PORTAL LINK FT
You can access the FollowMyHealth Patient Portal offered by [facility] by registering at the following website: [email or web address]. By joining [facility]’s FollowMyHealth portal, you will also be able to view your health information using other applications (apps) compatible with our system.

## 2024-08-20 NOTE — ED STATDOCS - ATTENDING APP SHARED VISIT CONTRIBUTION OF CARE
I, Bernardo Manley MD, personally saw the patient with ACP.  I have personally performed a face to face diagnostic evaluation on this patient.   The initial assessment was performed by myself and then the patient was handed off to the ACP. The patient was followed and re-evaluated by the ACP. All labs, imaging and procedures were evaluated and performed by the ACP and I was available for consultation if any questions in the patients care came up.   I personally made/approved the management plan and take responsibility for the patient management.

## 2024-08-21 ENCOUNTER — APPOINTMENT (OUTPATIENT)
Dept: UROLOGY | Facility: CLINIC | Age: 44
End: 2024-08-21
Payer: COMMERCIAL

## 2024-08-21 VITALS
TEMPERATURE: 98 F | WEIGHT: 147 LBS | OXYGEN SATURATION: 98 % | HEART RATE: 71 BPM | SYSTOLIC BLOOD PRESSURE: 103 MMHG | HEIGHT: 61 IN | RESPIRATION RATE: 14 BRPM | BODY MASS INDEX: 27.75 KG/M2 | DIASTOLIC BLOOD PRESSURE: 58 MMHG

## 2024-08-21 DIAGNOSIS — R10.9 UNSPECIFIED ABDOMINAL PAIN: ICD-10-CM

## 2024-08-21 DIAGNOSIS — N12 TUBULO-INTERSTITIAL NEPHRITIS, NOT SPECIFIED AS ACUTE OR CHRONIC: ICD-10-CM

## 2024-08-21 DIAGNOSIS — R31.0 GROSS HEMATURIA: ICD-10-CM

## 2024-08-21 DIAGNOSIS — N39.0 URINARY TRACT INFECTION, SITE NOT SPECIFIED: ICD-10-CM

## 2024-08-21 LAB
HCV AB S/CO SERPL IA: 0.2 S/CO — SIGNIFICANT CHANGE UP (ref 0–0.99)
HCV AB SERPL-IMP: SIGNIFICANT CHANGE UP

## 2024-08-21 PROCEDURE — 99213 OFFICE O/P EST LOW 20 MIN: CPT

## 2024-08-21 RX ORDER — NITROFURANTOIN MACROCRYSTALS 50 MG/1
50 CAPSULE ORAL
Qty: 60 | Refills: 8 | Status: ACTIVE | COMMUNITY
Start: 2024-08-21 | End: 1900-01-01

## 2024-08-21 NOTE — HISTORY OF PRESENT ILLNESS
[FreeTextEntry1] : This patient was recently seen in the hospital for right flank pain and had a CAT scan that showed bladder wall thickening and right sided ureteral enhancement. Patient also reports gross hematuria and dysuria. She was placed on Cefuroxime for 10 days for her infection. She reports she has history of recurrent urinary tract infections and is sexually active.

## 2024-08-21 NOTE — ASSESSMENT
[FreeTextEntry1] : Urine studies are ordered. Patient will finish her antibiotics and then obtain a CAT scan urogram in 2 weeks to further evaluate the urothelial enhancement. She will also be placed on post coital prophylaxis.

## 2024-08-22 LAB
APPEARANCE: CLEAR
BACTERIA: NEGATIVE /HPF
BILIRUBIN URINE: NEGATIVE
BLOOD URINE: ABNORMAL
CAST: 0 /LPF
COLOR: YELLOW
EPITHELIAL CELLS: 6 /HPF
GLUCOSE QUALITATIVE U: NEGATIVE MG/DL
KETONES URINE: NEGATIVE MG/DL
LEUKOCYTE ESTERASE URINE: ABNORMAL
MICROSCOPIC-UA: NORMAL
NITRITE URINE: NEGATIVE
PH URINE: 6
PROTEIN URINE: NEGATIVE MG/DL
RED BLOOD CELLS URINE: 6 /HPF
SPECIFIC GRAVITY URINE: 1.01
UROBILINOGEN URINE: 0.2 MG/DL
WHITE BLOOD CELLS URINE: 2 /HPF

## 2024-08-23 LAB
-  AMOXICILLIN/CLAVULANIC ACID: SIGNIFICANT CHANGE UP
-  AMPICILLIN/SULBACTAM: SIGNIFICANT CHANGE UP
-  AMPICILLIN: SIGNIFICANT CHANGE UP
-  AZTREONAM: SIGNIFICANT CHANGE UP
-  CEFAZOLIN: SIGNIFICANT CHANGE UP
-  CEFEPIME: SIGNIFICANT CHANGE UP
-  CEFOXITIN: SIGNIFICANT CHANGE UP
-  CEFTRIAXONE: SIGNIFICANT CHANGE UP
-  CEFUROXIME: SIGNIFICANT CHANGE UP
-  CIPROFLOXACIN: SIGNIFICANT CHANGE UP
-  ERTAPENEM: SIGNIFICANT CHANGE UP
-  GENTAMICIN: SIGNIFICANT CHANGE UP
-  IMIPENEM: SIGNIFICANT CHANGE UP
-  LEVOFLOXACIN: SIGNIFICANT CHANGE UP
-  MEROPENEM: SIGNIFICANT CHANGE UP
-  NITROFURANTOIN: SIGNIFICANT CHANGE UP
-  PIPERACILLIN/TAZOBACTAM: SIGNIFICANT CHANGE UP
-  TOBRAMYCIN: SIGNIFICANT CHANGE UP
-  TRIMETHOPRIM/SULFAMETHOXAZOLE: SIGNIFICANT CHANGE UP
BACTERIA UR CULT: NORMAL
CULTURE RESULTS: ABNORMAL
METHOD TYPE: SIGNIFICANT CHANGE UP
ORGANISM # SPEC MICROSCOPIC CNT: ABNORMAL
ORGANISM # SPEC MICROSCOPIC CNT: SIGNIFICANT CHANGE UP
SPECIMEN SOURCE: SIGNIFICANT CHANGE UP

## 2024-08-25 LAB
CULTURE RESULTS: SIGNIFICANT CHANGE UP
CULTURE RESULTS: SIGNIFICANT CHANGE UP
SPECIMEN SOURCE: SIGNIFICANT CHANGE UP
SPECIMEN SOURCE: SIGNIFICANT CHANGE UP

## 2024-08-28 ENCOUNTER — APPOINTMENT (OUTPATIENT)
Dept: CT IMAGING | Facility: CLINIC | Age: 44
End: 2024-08-28

## 2024-08-28 ENCOUNTER — OUTPATIENT (OUTPATIENT)
Dept: OUTPATIENT SERVICES | Facility: HOSPITAL | Age: 44
LOS: 1 days | End: 2024-08-28
Payer: COMMERCIAL

## 2024-08-28 DIAGNOSIS — Z45.09 ENCOUNTER FOR ADJUSTMENT AND MANAGEMENT OF OTHER CARDIAC DEVICE: Chronic | ICD-10-CM

## 2024-08-28 DIAGNOSIS — Z98.51 TUBAL LIGATION STATUS: Chronic | ICD-10-CM

## 2024-08-28 DIAGNOSIS — R10.9 UNSPECIFIED ABDOMINAL PAIN: ICD-10-CM

## 2024-08-28 DIAGNOSIS — Z98.890 OTHER SPECIFIED POSTPROCEDURAL STATES: Chronic | ICD-10-CM

## 2024-08-28 PROCEDURE — 74178 CT ABD&PLV WO CNTR FLWD CNTR: CPT

## 2024-08-28 PROCEDURE — 74178 CT ABD&PLV WO CNTR FLWD CNTR: CPT | Mod: 26

## 2024-08-29 LAB — URINE CYTOLOGY: NORMAL

## 2024-09-11 ENCOUNTER — APPOINTMENT (OUTPATIENT)
Dept: UROLOGY | Facility: CLINIC | Age: 44
End: 2024-09-11
Payer: COMMERCIAL

## 2024-09-11 VITALS
SYSTOLIC BLOOD PRESSURE: 112 MMHG | HEIGHT: 61 IN | HEART RATE: 68 BPM | WEIGHT: 147 LBS | TEMPERATURE: 97.8 F | OXYGEN SATURATION: 99 % | RESPIRATION RATE: 14 BRPM | BODY MASS INDEX: 27.75 KG/M2 | DIASTOLIC BLOOD PRESSURE: 73 MMHG

## 2024-09-11 DIAGNOSIS — N34.3 URETHRAL SYNDROME, UNSPECIFIED: ICD-10-CM

## 2024-09-11 DIAGNOSIS — N12 TUBULO-INTERSTITIAL NEPHRITIS, NOT SPECIFIED AS ACUTE OR CHRONIC: ICD-10-CM

## 2024-09-11 PROCEDURE — 52285 CYSTOSCOPY AND TREATMENT: CPT

## 2024-09-20 ENCOUNTER — APPOINTMENT (OUTPATIENT)
Dept: UROLOGY | Facility: CLINIC | Age: 44
End: 2024-09-20

## 2024-09-20 VITALS
DIASTOLIC BLOOD PRESSURE: 65 MMHG | BODY MASS INDEX: 27.75 KG/M2 | HEART RATE: 72 BPM | SYSTOLIC BLOOD PRESSURE: 100 MMHG | TEMPERATURE: 97.7 F | WEIGHT: 147 LBS | RESPIRATION RATE: 14 BRPM | OXYGEN SATURATION: 98 % | HEIGHT: 61 IN

## 2024-09-20 DIAGNOSIS — N39.0 URINARY TRACT INFECTION, SITE NOT SPECIFIED: ICD-10-CM

## 2024-09-20 PROCEDURE — 51784 ANAL/URINARY MUSCLE STUDY: CPT

## 2024-09-20 PROCEDURE — 51729 CYSTOMETROGRAM W/VP&UP: CPT

## 2024-09-20 PROCEDURE — 51797 INTRAABDOMINAL PRESSURE TEST: CPT

## 2024-10-27 ENCOUNTER — NON-APPOINTMENT (OUTPATIENT)
Age: 44
End: 2024-10-27

## 2025-01-17 ENCOUNTER — RESULT REVIEW (OUTPATIENT)
Age: 45
End: 2025-01-17

## 2025-03-19 ENCOUNTER — EMERGENCY (EMERGENCY)
Facility: HOSPITAL | Age: 45
LOS: 0 days | Discharge: HOME | End: 2025-03-19
Attending: STUDENT IN AN ORGANIZED HEALTH CARE EDUCATION/TRAINING PROGRAM
Payer: COMMERCIAL

## 2025-03-19 VITALS
RESPIRATION RATE: 16 BRPM | SYSTOLIC BLOOD PRESSURE: 122 MMHG | OXYGEN SATURATION: 99 % | TEMPERATURE: 98 F | DIASTOLIC BLOOD PRESSURE: 76 MMHG | HEART RATE: 72 BPM

## 2025-03-19 VITALS
OXYGEN SATURATION: 100 % | RESPIRATION RATE: 18 BRPM | TEMPERATURE: 98 F | HEART RATE: 70 BPM | SYSTOLIC BLOOD PRESSURE: 115 MMHG | DIASTOLIC BLOOD PRESSURE: 71 MMHG

## 2025-03-19 DIAGNOSIS — M54.2 CERVICALGIA: ICD-10-CM

## 2025-03-19 DIAGNOSIS — G89.29 OTHER CHRONIC PAIN: ICD-10-CM

## 2025-03-19 DIAGNOSIS — Z98.890 OTHER SPECIFIED POSTPROCEDURAL STATES: Chronic | ICD-10-CM

## 2025-03-19 DIAGNOSIS — R30.0 DYSURIA: ICD-10-CM

## 2025-03-19 DIAGNOSIS — Z45.09 ENCOUNTER FOR ADJUSTMENT AND MANAGEMENT OF OTHER CARDIAC DEVICE: Chronic | ICD-10-CM

## 2025-03-19 DIAGNOSIS — R35.0 FREQUENCY OF MICTURITION: ICD-10-CM

## 2025-03-19 DIAGNOSIS — Z98.51 TUBAL LIGATION STATUS: Chronic | ICD-10-CM

## 2025-03-19 DIAGNOSIS — M47.812 SPONDYLOSIS WITHOUT MYELOPATHY OR RADICULOPATHY, CERVICAL REGION: ICD-10-CM

## 2025-03-19 DIAGNOSIS — R20.2 PARESTHESIA OF SKIN: ICD-10-CM

## 2025-03-19 LAB
ALBUMIN SERPL ELPH-MCNC: 3.9 G/DL — SIGNIFICANT CHANGE UP (ref 3.3–5)
ALP SERPL-CCNC: 78 U/L — SIGNIFICANT CHANGE UP (ref 40–120)
ALT FLD-CCNC: 18 U/L — SIGNIFICANT CHANGE UP (ref 12–78)
ANION GAP SERPL CALC-SCNC: 2 MMOL/L — LOW (ref 5–17)
APPEARANCE UR: CLEAR — SIGNIFICANT CHANGE UP
APTT BLD: 29 SEC — SIGNIFICANT CHANGE UP (ref 24.5–35.6)
AST SERPL-CCNC: 12 U/L — LOW (ref 15–37)
BACTERIA # UR AUTO: NEGATIVE /HPF — SIGNIFICANT CHANGE UP
BASOPHILS # BLD AUTO: 0.04 K/UL — SIGNIFICANT CHANGE UP (ref 0–0.2)
BASOPHILS NFR BLD AUTO: 0.6 % — SIGNIFICANT CHANGE UP (ref 0–2)
BILIRUB SERPL-MCNC: 0.4 MG/DL — SIGNIFICANT CHANGE UP (ref 0.2–1.2)
BILIRUB UR-MCNC: NEGATIVE — SIGNIFICANT CHANGE UP
BUN SERPL-MCNC: 14 MG/DL — SIGNIFICANT CHANGE UP (ref 7–23)
CALCIUM SERPL-MCNC: 9.1 MG/DL — SIGNIFICANT CHANGE UP (ref 8.5–10.1)
CAST: 0 /LPF — SIGNIFICANT CHANGE UP (ref 0–4)
CHLORIDE SERPL-SCNC: 105 MMOL/L — SIGNIFICANT CHANGE UP (ref 96–108)
CO2 SERPL-SCNC: 30 MMOL/L — SIGNIFICANT CHANGE UP (ref 22–31)
COLOR SPEC: YELLOW — SIGNIFICANT CHANGE UP
CREAT SERPL-MCNC: 0.81 MG/DL — SIGNIFICANT CHANGE UP (ref 0.5–1.3)
DIFF PNL FLD: ABNORMAL
EGFR: 91 ML/MIN/1.73M2 — SIGNIFICANT CHANGE UP
EGFR: 91 ML/MIN/1.73M2 — SIGNIFICANT CHANGE UP
EOSINOPHIL # BLD AUTO: 0.13 K/UL — SIGNIFICANT CHANGE UP (ref 0–0.5)
EOSINOPHIL NFR BLD AUTO: 2 % — SIGNIFICANT CHANGE UP (ref 0–6)
GLUCOSE SERPL-MCNC: 98 MG/DL — SIGNIFICANT CHANGE UP (ref 70–99)
GLUCOSE UR QL: NEGATIVE MG/DL — SIGNIFICANT CHANGE UP
HCG SERPL-ACNC: <1 MIU/ML — SIGNIFICANT CHANGE UP
HCT VFR BLD CALC: 40 % — SIGNIFICANT CHANGE UP (ref 34.5–45)
HGB BLD-MCNC: 13.1 G/DL — SIGNIFICANT CHANGE UP (ref 11.5–15.5)
IMM GRANULOCYTES # BLD AUTO: 0.01 K/UL — SIGNIFICANT CHANGE UP (ref 0–0.07)
IMM GRANULOCYTES NFR BLD AUTO: 0.2 % — SIGNIFICANT CHANGE UP (ref 0–0.9)
INR BLD: 1.03 RATIO — SIGNIFICANT CHANGE UP (ref 0.85–1.16)
KETONES UR-MCNC: ABNORMAL MG/DL
LEUKOCYTE ESTERASE UR-ACNC: NEGATIVE — SIGNIFICANT CHANGE UP
LYMPHOCYTES # BLD AUTO: 1.62 K/UL — SIGNIFICANT CHANGE UP (ref 1–3.3)
LYMPHOCYTES NFR BLD AUTO: 25 % — SIGNIFICANT CHANGE UP (ref 13–44)
MAGNESIUM SERPL-MCNC: 2 MG/DL — SIGNIFICANT CHANGE UP (ref 1.6–2.6)
MCHC RBC-ENTMCNC: 30.3 PG — SIGNIFICANT CHANGE UP (ref 27–34)
MCHC RBC-ENTMCNC: 32.8 G/DL — SIGNIFICANT CHANGE UP (ref 32–36)
MCV RBC AUTO: 92.4 FL — SIGNIFICANT CHANGE UP (ref 80–100)
MONOCYTES # BLD AUTO: 0.61 K/UL — SIGNIFICANT CHANGE UP (ref 0–0.9)
MONOCYTES NFR BLD AUTO: 9.4 % — SIGNIFICANT CHANGE UP (ref 2–14)
NEUTROPHILS # BLD AUTO: 4.07 K/UL — SIGNIFICANT CHANGE UP (ref 1.8–7.4)
NEUTROPHILS NFR BLD AUTO: 62.8 % — SIGNIFICANT CHANGE UP (ref 43–77)
NITRITE UR-MCNC: NEGATIVE — SIGNIFICANT CHANGE UP
NRBC # BLD AUTO: 0 K/UL — SIGNIFICANT CHANGE UP (ref 0–0)
NRBC # FLD: 0 K/UL — SIGNIFICANT CHANGE UP (ref 0–0)
NRBC BLD AUTO-RTO: 0 /100 WBCS — SIGNIFICANT CHANGE UP (ref 0–0)
PH UR: 6 — SIGNIFICANT CHANGE UP (ref 5–8)
PLATELET # BLD AUTO: 282 K/UL — SIGNIFICANT CHANGE UP (ref 150–400)
PMV BLD: 10 FL — SIGNIFICANT CHANGE UP (ref 7–13)
POTASSIUM SERPL-MCNC: 3.8 MMOL/L — SIGNIFICANT CHANGE UP (ref 3.5–5.3)
POTASSIUM SERPL-SCNC: 3.8 MMOL/L — SIGNIFICANT CHANGE UP (ref 3.5–5.3)
PROT SERPL-MCNC: 7.9 GM/DL — SIGNIFICANT CHANGE UP (ref 6–8.3)
PROT UR-MCNC: NEGATIVE MG/DL — SIGNIFICANT CHANGE UP
PROTHROM AB SERPL-ACNC: 11.8 SEC — SIGNIFICANT CHANGE UP (ref 9.9–13.4)
RBC # BLD: 4.33 M/UL — SIGNIFICANT CHANGE UP (ref 3.8–5.2)
RBC # FLD: 12.1 % — SIGNIFICANT CHANGE UP (ref 10.3–14.5)
RBC CASTS # UR COMP ASSIST: 34 /HPF — HIGH (ref 0–4)
SODIUM SERPL-SCNC: 137 MMOL/L — SIGNIFICANT CHANGE UP (ref 135–145)
SP GR SPEC: 1.02 — SIGNIFICANT CHANGE UP (ref 1–1.03)
SQUAMOUS # UR AUTO: 4 /HPF — SIGNIFICANT CHANGE UP (ref 0–5)
UROBILINOGEN FLD QL: 0.2 MG/DL — SIGNIFICANT CHANGE UP (ref 0.2–1)
WBC # BLD: 6.48 K/UL — SIGNIFICANT CHANGE UP (ref 3.8–10.5)
WBC # FLD AUTO: 6.48 K/UL — SIGNIFICANT CHANGE UP (ref 3.8–10.5)
WBC UR QL: 0 /HPF — SIGNIFICANT CHANGE UP (ref 0–5)

## 2025-03-19 PROCEDURE — 99284 EMERGENCY DEPT VISIT MOD MDM: CPT | Mod: 25

## 2025-03-19 PROCEDURE — 80053 COMPREHEN METABOLIC PANEL: CPT

## 2025-03-19 PROCEDURE — 85610 PROTHROMBIN TIME: CPT

## 2025-03-19 PROCEDURE — 83735 ASSAY OF MAGNESIUM: CPT

## 2025-03-19 PROCEDURE — 99284 EMERGENCY DEPT VISIT MOD MDM: CPT

## 2025-03-19 PROCEDURE — 81001 URINALYSIS AUTO W/SCOPE: CPT

## 2025-03-19 PROCEDURE — 85025 COMPLETE CBC W/AUTO DIFF WBC: CPT

## 2025-03-19 PROCEDURE — 85730 THROMBOPLASTIN TIME PARTIAL: CPT

## 2025-03-19 PROCEDURE — 70450 CT HEAD/BRAIN W/O DYE: CPT | Mod: MC

## 2025-03-19 PROCEDURE — 84702 CHORIONIC GONADOTROPIN TEST: CPT

## 2025-03-19 PROCEDURE — 70450 CT HEAD/BRAIN W/O DYE: CPT | Mod: 26

## 2025-03-19 PROCEDURE — 72125 CT NECK SPINE W/O DYE: CPT | Mod: MC

## 2025-03-19 PROCEDURE — 36415 COLL VENOUS BLD VENIPUNCTURE: CPT

## 2025-03-19 PROCEDURE — 72125 CT NECK SPINE W/O DYE: CPT | Mod: 26

## 2025-03-19 RX ORDER — CYCLOBENZAPRINE HYDROCHLORIDE 15 MG/1
10 CAPSULE, EXTENDED RELEASE ORAL ONCE
Refills: 0 | Status: COMPLETED | OUTPATIENT
Start: 2025-03-19 | End: 2025-03-19

## 2025-03-19 RX ADMIN — CYCLOBENZAPRINE HYDROCHLORIDE 10 MILLIGRAM(S): 15 CAPSULE, EXTENDED RELEASE ORAL at 14:06

## 2025-03-19 NOTE — ED STATDOCS - NSFOLLOWUPINSTRUCTIONS_ED_ALL_ED_FT
Consulte con lopez médico de cabecera si presenta dameon en la orina y con el neurólogo para david evaluación más detallada del entumecimiento, hormigueo o dolor de waylon.  Bowman Motrin/Tylenol para el dolor.    Regrese a Urgencias si los síntomas empeoran o persisten, o si se presenta cualquier síntoma nuevo o preocupante. Si tiene problemas para obtener davdi lydia de seguimiento, llame al [phone] o al [phone] para encontrar un médico o especialista que acepte lopez seguro en lopez wanda.        Follow up with your primary care doctor for the blood in the urine and with the neurologist for further evaluation of your numbness/tingling/neck pain.   Take motrin/tylenol for pain.     Return to the Emergency Department for worsening or persistent symptoms, and/or ANY NEW OR CONCERNING SYMPTOMS. If you have issues obtaining follow up, please call: [phone] or [phone]  to obtain a doctor or specialist who takes your insurance in your area.

## 2025-03-19 NOTE — ED STATDOCS - CLINICAL SUMMARY MEDICAL DECISION MAKING FREE TEXT BOX
43 yo female with multiple complaints; b/l hand, shoulder numbness/tingling; neck pain; non focal neuro exam; due to see neuro tomorrow; basic labs; ct imaging; outpatient neuro work up.

## 2025-03-19 NOTE — ED ADULT NURSE NOTE - OBJECTIVE STATEMENT
Pt comes to the ED complaining of numbness in both hands for the past 3 weeks. Pt states that she has chronic neck and back pain, but has never had the tingling and numbness before. Pt was seen by her PMD Mitchel and was sent for testing, but she is unsure whether it was an insurance issue or what, but the appointments were cancelled. Pt was told that she should come to the ED for further evaluation if the numbness continued.
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
## 2025-03-19 NOTE — ED STATDOCS - ATTENDING APP SHARED VISIT CONTRIBUTION OF CARE
I, Mitzy Coleman DO,  performed the initial face to face bedside interview with this patient regarding history of present illness, review of symptoms and relevant past medical, social and family history.  I completed an independent physical examination.  I was the initial provider who evaluated this patient.   I personally saw the patient and performed a substantive portion of the visit including all aspects of the medical decision making.  I have signed out the follow up of any pending tests (i.e. labs, radiological studies) to the ACP.  I have communicated the patient’s plan of care and disposition with the ACP.  The history, relevant review of systems, past medical and surgical history, medical decision making, and physical examination was documented by the scribe in my presence and I attest to the accuracy of the documentation.

## 2025-03-19 NOTE — ED STATDOCS - PATIENT PORTAL LINK FT
You can access the FollowMyHealth Patient Portal offered by [facility] by registering at the following website: [email or web address]. By joining [email or web address]’s FollowMyHealth portal, you will also be able to view your health information using other applications (apps) compatible with our system.

## 2025-03-19 NOTE — ED STATDOCS - NEUROLOGICAL, MLM
sensation is normal and strength is normal. symmetrical sensation b/l, no pronator drift, + facial symmetry,

## 2025-03-19 NOTE — ED ADULT TRIAGE NOTE - CHIEF COMPLAINT QUOTE
Pt present to ER c/o numbness in tongue and b/l hands, pain in lower back and neck. onset of symptoms began 3 weeks PTA and have been continuous. Denies HA/change in vision. Face symmetrical, equal b/l strength, speech normal, gait steady.

## 2025-03-19 NOTE — ED ADULT NURSE NOTE - NSFALLUNIVINTERV_ED_ALL_ED
Bed/Stretcher in lowest position, wheels locked, appropriate side rails in place/Call bell, personal items and telephone in reach/Instruct patient to call for assistance before getting out of bed/chair/stretcher/Non-slip footwear applied when patient is off stretcher/[place] to call system/Physically safe environment - no spills, clutter or unnecessary equipment/Purposeful proactive rounding/Room/bathroom lighting operational, light cord in reach

## 2025-06-19 NOTE — ED PROVIDER NOTE - OBJECTIVE STATEMENT
37 y/o female with a PMHx prediabetes on metformin, syncope with loop recorder in past, presents to the ED c/o chest pain since yesterday. Pt states while laying down last night she had left sided chest pain that radiated to her left arm and back. States she had left fingertip numbness, HA, nausea, dysuria, and dizziness. States her symptoms have been intermittent since onset. Has not taken any medications for symptoms. In ED, pt still has chest pain described as 7/10 in severity. Pt states she had a loop recorder in the past for three years for recurrent syncopal episodes but was found to be fine. Denies recent travel, fever. Nonsmoker. No EtOH consumption. No illicit drug use. PMD: Dr. Moore.
25.6

## 2025-07-24 NOTE — ED ADULT NURSE NOTE - CAS TRG GENERAL AIRWAY, MLM
Sunscreen Recommendations: Preferred sunscreens discussed with patient, primarily focusing on zinc-oxide based mineral sunscreens with frequent reapplication when in sun.  Detail Level: Generalized Ipl Recommendations: Cosmetic treatment with IPL.  Detail Level: Zone Detail Level: Detailed Patent

## 2025-09-01 ENCOUNTER — EMERGENCY (EMERGENCY)
Facility: HOSPITAL | Age: 45
LOS: 0 days | Discharge: HOME | End: 2025-09-01
Attending: STUDENT IN AN ORGANIZED HEALTH CARE EDUCATION/TRAINING PROGRAM
Payer: COMMERCIAL

## 2025-09-01 VITALS
OXYGEN SATURATION: 98 % | HEART RATE: 66 BPM | RESPIRATION RATE: 18 BRPM | DIASTOLIC BLOOD PRESSURE: 62 MMHG | SYSTOLIC BLOOD PRESSURE: 104 MMHG | TEMPERATURE: 98 F

## 2025-09-01 VITALS
RESPIRATION RATE: 18 BRPM | TEMPERATURE: 98 F | WEIGHT: 149.91 LBS | DIASTOLIC BLOOD PRESSURE: 79 MMHG | HEART RATE: 75 BPM | OXYGEN SATURATION: 100 % | SYSTOLIC BLOOD PRESSURE: 123 MMHG

## 2025-09-01 DIAGNOSIS — Z98.890 OTHER SPECIFIED POSTPROCEDURAL STATES: Chronic | ICD-10-CM

## 2025-09-01 DIAGNOSIS — Z98.51 TUBAL LIGATION STATUS: Chronic | ICD-10-CM

## 2025-09-01 DIAGNOSIS — R42 DIZZINESS AND GIDDINESS: ICD-10-CM

## 2025-09-01 DIAGNOSIS — Z45.09 ENCOUNTER FOR ADJUSTMENT AND MANAGEMENT OF OTHER CARDIAC DEVICE: Chronic | ICD-10-CM

## 2025-09-01 LAB
ADD ON TEST-SPECIMEN IN LAB: SIGNIFICANT CHANGE UP
ANION GAP SERPL CALC-SCNC: 3 MMOL/L — LOW (ref 5–17)
BASOPHILS # BLD AUTO: 0.03 K/UL — SIGNIFICANT CHANGE UP (ref 0–0.2)
BASOPHILS NFR BLD AUTO: 0.5 % — SIGNIFICANT CHANGE UP (ref 0–2)
BUN SERPL-MCNC: 13 MG/DL — SIGNIFICANT CHANGE UP (ref 7–23)
CALCIUM SERPL-MCNC: 9.2 MG/DL — SIGNIFICANT CHANGE UP (ref 8.5–10.1)
CHLORIDE SERPL-SCNC: 110 MMOL/L — HIGH (ref 96–108)
CO2 SERPL-SCNC: 27 MMOL/L — SIGNIFICANT CHANGE UP (ref 22–31)
CREAT SERPL-MCNC: 0.82 MG/DL — SIGNIFICANT CHANGE UP (ref 0.5–1.3)
EGFR: 89 ML/MIN/1.73M2 — SIGNIFICANT CHANGE UP
EGFR: 89 ML/MIN/1.73M2 — SIGNIFICANT CHANGE UP
EOSINOPHIL # BLD AUTO: 0.11 K/UL — SIGNIFICANT CHANGE UP (ref 0–0.5)
EOSINOPHIL NFR BLD AUTO: 1.9 % — SIGNIFICANT CHANGE UP (ref 0–6)
GLUCOSE SERPL-MCNC: 107 MG/DL — HIGH (ref 70–99)
HCG SERPL-ACNC: <1 MIU/ML — SIGNIFICANT CHANGE UP
HCT VFR BLD CALC: 37.6 % — SIGNIFICANT CHANGE UP (ref 34.5–45)
HGB BLD-MCNC: 12.4 G/DL — SIGNIFICANT CHANGE UP (ref 11.5–15.5)
IMM GRANULOCYTES # BLD AUTO: 0.02 K/UL — SIGNIFICANT CHANGE UP (ref 0–0.07)
IMM GRANULOCYTES NFR BLD AUTO: 0.3 % — SIGNIFICANT CHANGE UP (ref 0–0.9)
LYMPHOCYTES # BLD AUTO: 1.54 K/UL — SIGNIFICANT CHANGE UP (ref 1–3.3)
LYMPHOCYTES NFR BLD AUTO: 26.6 % — SIGNIFICANT CHANGE UP (ref 13–44)
MCHC RBC-ENTMCNC: 29.8 PG — SIGNIFICANT CHANGE UP (ref 27–34)
MCHC RBC-ENTMCNC: 33 G/DL — SIGNIFICANT CHANGE UP (ref 32–36)
MCV RBC AUTO: 90.4 FL — SIGNIFICANT CHANGE UP (ref 80–100)
MONOCYTES # BLD AUTO: 0.51 K/UL — SIGNIFICANT CHANGE UP (ref 0–0.9)
MONOCYTES NFR BLD AUTO: 8.8 % — SIGNIFICANT CHANGE UP (ref 2–14)
NEUTROPHILS # BLD AUTO: 3.57 K/UL — SIGNIFICANT CHANGE UP (ref 1.8–7.4)
NEUTROPHILS NFR BLD AUTO: 61.9 % — SIGNIFICANT CHANGE UP (ref 43–77)
NRBC # BLD AUTO: 0 K/UL — SIGNIFICANT CHANGE UP (ref 0–0)
NRBC # FLD: 0 K/UL — SIGNIFICANT CHANGE UP (ref 0–0)
NRBC BLD AUTO-RTO: 0 /100 WBCS — SIGNIFICANT CHANGE UP (ref 0–0)
PLATELET # BLD AUTO: 351 K/UL — SIGNIFICANT CHANGE UP (ref 150–400)
PMV BLD: 9.8 FL — SIGNIFICANT CHANGE UP (ref 7–13)
POTASSIUM SERPL-MCNC: 3.9 MMOL/L — SIGNIFICANT CHANGE UP (ref 3.5–5.3)
POTASSIUM SERPL-SCNC: 3.9 MMOL/L — SIGNIFICANT CHANGE UP (ref 3.5–5.3)
RBC # BLD: 4.16 M/UL — SIGNIFICANT CHANGE UP (ref 3.8–5.2)
RBC # FLD: 11.9 % — SIGNIFICANT CHANGE UP (ref 10.3–14.5)
SODIUM SERPL-SCNC: 140 MMOL/L — SIGNIFICANT CHANGE UP (ref 135–145)
WBC # BLD: 5.78 K/UL — SIGNIFICANT CHANGE UP (ref 3.8–10.5)
WBC # FLD AUTO: 5.78 K/UL — SIGNIFICANT CHANGE UP (ref 3.8–10.5)

## 2025-09-01 PROCEDURE — 84702 CHORIONIC GONADOTROPIN TEST: CPT

## 2025-09-01 PROCEDURE — 93010 ELECTROCARDIOGRAM REPORT: CPT

## 2025-09-01 PROCEDURE — 96360 HYDRATION IV INFUSION INIT: CPT

## 2025-09-01 PROCEDURE — 99283 EMERGENCY DEPT VISIT LOW MDM: CPT | Mod: 25

## 2025-09-01 PROCEDURE — 85025 COMPLETE CBC W/AUTO DIFF WBC: CPT

## 2025-09-01 PROCEDURE — 99284 EMERGENCY DEPT VISIT MOD MDM: CPT

## 2025-09-01 PROCEDURE — 36415 COLL VENOUS BLD VENIPUNCTURE: CPT

## 2025-09-01 PROCEDURE — 82962 GLUCOSE BLOOD TEST: CPT

## 2025-09-01 PROCEDURE — 93005 ELECTROCARDIOGRAM TRACING: CPT

## 2025-09-01 PROCEDURE — 80048 BASIC METABOLIC PNL TOTAL CA: CPT

## 2025-09-01 RX ORDER — SODIUM CHLORIDE 9 G/1000ML
1000 INJECTION, SOLUTION INTRAVENOUS ONCE
Refills: 0 | Status: COMPLETED | OUTPATIENT
Start: 2025-09-01 | End: 2025-09-01

## 2025-09-01 RX ADMIN — SODIUM CHLORIDE 1000 MILLILITER(S): 9 INJECTION, SOLUTION INTRAVENOUS at 08:53

## 2025-09-01 RX ADMIN — SODIUM CHLORIDE 1000 MILLILITER(S): 9 INJECTION, SOLUTION INTRAVENOUS at 10:17
